# Patient Record
Sex: FEMALE | Race: WHITE | NOT HISPANIC OR LATINO | Employment: UNEMPLOYED | URBAN - METROPOLITAN AREA
[De-identification: names, ages, dates, MRNs, and addresses within clinical notes are randomized per-mention and may not be internally consistent; named-entity substitution may affect disease eponyms.]

---

## 2023-08-15 ENCOUNTER — HOSPITAL ENCOUNTER (OUTPATIENT)
Dept: DATA CONVERSION | Facility: HOSPITAL | Age: 58
End: 2023-08-15

## 2023-08-15 DIAGNOSIS — N20.0 CALCULUS OF KIDNEY: ICD-10-CM

## 2023-08-28 ENCOUNTER — HOSPITAL ENCOUNTER (OUTPATIENT)
Dept: DATA CONVERSION | Facility: HOSPITAL | Age: 58
Discharge: HOME | End: 2023-08-28
Payer: COMMERCIAL

## 2023-08-28 DIAGNOSIS — D64.9 ANEMIA, UNSPECIFIED: ICD-10-CM

## 2023-08-28 DIAGNOSIS — J02.9 ACUTE PHARYNGITIS, UNSPECIFIED: ICD-10-CM

## 2023-08-28 DIAGNOSIS — J18.9 PNEUMONIA, UNSPECIFIED ORGANISM: ICD-10-CM

## 2023-08-28 DIAGNOSIS — B37.81 CANDIDAL ESOPHAGITIS (MULTI): ICD-10-CM

## 2023-08-28 DIAGNOSIS — D72.829 ELEVATED WHITE BLOOD CELL COUNT, UNSPECIFIED: ICD-10-CM

## 2023-08-28 DIAGNOSIS — K13.79 OTHER LESIONS OF ORAL MUCOSA: ICD-10-CM

## 2023-08-28 LAB
ABO + RH BLD: NORMAL
ANION GAP SERPL CALCULATED.3IONS-SCNC: 13 MMOL/L (ref 0–19)
BACTERIA SPEC CULT: NORMAL
BASOPHILS # BLD AUTO: 0.03 K/UL (ref 0–0.22)
BASOPHILS NFR BLD AUTO: 0.2 % (ref 0–1)
BLD GP AB SCN SERPL QL: NEGATIVE
BLD PROD TYP BPU: NORMAL
BUN SERPL-MCNC: 13 MG/DL (ref 8–25)
BUN/CREAT SERPL: 16.3 RATIO (ref 8–21)
CALCIUM SERPL-MCNC: 11.2 MG/DL (ref 8.5–10.4)
CHLORIDE SERPL-SCNC: 101 MMOL/L (ref 97–107)
CO2 SERPL-SCNC: 24 MMOL/L (ref 24–31)
CREAT SERPL-MCNC: 0.8 MG/DL (ref 0.4–1.6)
DEPRECATED RDW RBC AUTO: 42.5 FL (ref 37–54)
DEPRECATED S PYO AG THROAT QL EIA: NORMAL
DIFFERENTIAL METHOD BLD: ABNORMAL
EOSINOPHIL # BLD AUTO: 0.16 K/UL (ref 0–0.45)
EOSINOPHIL NFR BLD: 1.2 % (ref 0–3)
ERYTHROCYTE [DISTWIDTH] IN BLOOD BY AUTOMATED COUNT: 17.6 % (ref 11.7–15)
GFR SERPL CREATININE-BSD FRML MDRD: 85 ML/MIN/1.73 M2
GLUCOSE SERPL-MCNC: 140 MG/DL (ref 65–99)
HCT VFR BLD AUTO: 30.9 % (ref 36–44)
HGB BLD-MCNC: 9.1 GM/DL (ref 12–15)
HX OF BLOOD TRANSFUSION: NORMAL
IMM GRANULOCYTES # BLD AUTO: 0.06 K/UL (ref 0–0.1)
LYMPHOCYTES # BLD AUTO: 3.76 K/UL (ref 1.2–3.2)
LYMPHOCYTES NFR BLD MANUAL: 27.8 % (ref 20–40)
MCH RBC QN AUTO: 20.2 PG (ref 26–34)
MCHC RBC AUTO-ENTMCNC: 29.4 % (ref 31–37)
MCV RBC AUTO: 68.7 FL (ref 80–100)
MONOCYTES # BLD AUTO: 0.85 K/UL (ref 0–0.8)
MONOCYTES NFR BLD MANUAL: 6.3 % (ref 0–8)
NEUTROPHILS # BLD AUTO: 8.66 K/UL
NEUTROPHILS # BLD AUTO: 8.66 K/UL (ref 1.8–7.7)
NEUTROPHILS.IMMATURE NFR BLD: 0.4 % (ref 0–1)
NEUTS SEG NFR BLD: 64.1 % (ref 50–70)
NRBC BLD-RTO: 0 /100 WBC
NUM BPU REQUESTED: 0
PLATELET # BLD AUTO: 487 K/UL (ref 150–450)
PMV BLD AUTO: 9.3 CU (ref 7–12.6)
POTASSIUM SERPL-SCNC: 4.4 MMOL/L (ref 3.4–5.1)
RBC # BLD AUTO: 4.5 M/UL (ref 4–4.9)
REPORT STATUS -LH SQ DATA CONVERSION: NORMAL
SERVICE CMNT-IMP: NORMAL
SODIUM SERPL-SCNC: 138 MMOL/L (ref 133–145)
SPECIMEN EXP DATE BLD: NORMAL
SPECIMEN SOURCE: NORMAL
TEST ORDERED: NORMAL
TRANSF BAND NUM PATIENT: NORMAL
WBC # BLD AUTO: 13.5 K/UL (ref 4.5–11)

## 2023-09-01 ENCOUNTER — HOSPITAL ENCOUNTER (OUTPATIENT)
Dept: DATA CONVERSION | Facility: HOSPITAL | Age: 58
Discharge: HOME | End: 2023-09-01
Payer: COMMERCIAL

## 2023-09-01 DIAGNOSIS — E83.52 HYPERCALCEMIA: ICD-10-CM

## 2023-09-01 LAB
CA-I BLD-SCNC: 1.21 MMOL/L (ref 1.1–1.33)
CALCIUM SERPL-MCNC: 10 MG/DL (ref 8.5–10.4)
PHOSPHATE SERPL-MCNC: 3 MG/DL (ref 2.5–4.5)
PTH-INTACT SERPL-MCNC: 17 PG/ML (ref 15–65)

## 2023-10-04 DIAGNOSIS — I10 ESSENTIAL (PRIMARY) HYPERTENSION: ICD-10-CM

## 2023-10-09 RX ORDER — LISINOPRIL 20 MG/1
20 TABLET ORAL DAILY
Qty: 90 TABLET | Refills: 3 | Status: SHIPPED | OUTPATIENT
Start: 2023-10-09 | End: 2024-02-07 | Stop reason: WASHOUT

## 2023-10-12 DIAGNOSIS — R10.84 GENERALIZED ABDOMINAL PAIN: Primary | ICD-10-CM

## 2023-10-12 RX ORDER — TRAMADOL HYDROCHLORIDE 50 MG/1
50 TABLET ORAL EVERY 6 HOURS
Qty: 28 TABLET | Refills: 0 | Status: SHIPPED | OUTPATIENT
Start: 2023-10-12 | End: 2023-10-19

## 2023-10-12 RX ORDER — TRAMADOL HYDROCHLORIDE 50 MG/1
TABLET ORAL EVERY 6 HOURS
COMMUNITY
Start: 2023-05-10 | End: 2023-10-12 | Stop reason: SDUPTHER

## 2023-10-12 NOTE — TELEPHONE ENCOUNTER
Patient states her side is hurting and she needs her tramadol refilled.  She stated she is completely out of medication.

## 2023-10-20 ENCOUNTER — TELEPHONE (OUTPATIENT)
Dept: PRIMARY CARE | Facility: CLINIC | Age: 58
End: 2023-10-20
Payer: COMMERCIAL

## 2023-10-20 DIAGNOSIS — R10.9 SIDE PAIN: Primary | ICD-10-CM

## 2023-10-20 RX ORDER — TRAMADOL HYDROCHLORIDE 50 MG/1
50 TABLET ORAL EVERY 6 HOURS PRN
Qty: 15 TABLET | Refills: 0 | Status: SHIPPED | OUTPATIENT
Start: 2023-10-20 | End: 2023-10-27

## 2023-10-20 NOTE — TELEPHONE ENCOUNTER
Called and spoke with pt, who verbally understands. Pt states that she will keep an eye on symptoms and will call to make an appt.

## 2023-10-20 NOTE — TELEPHONE ENCOUNTER
Patient is requesting a refill on her Tramadol. She states that she is having some side pain.   She also states that she had some a biopsy done and she's having an issue with her stomach     [place] coverage

## 2023-10-24 DIAGNOSIS — I10 ESSENTIAL (PRIMARY) HYPERTENSION: ICD-10-CM

## 2023-10-24 DIAGNOSIS — K25.9 GASTRIC ULCER, UNSPECIFIED AS ACUTE OR CHRONIC, WITHOUT HEMORRHAGE OR PERFORATION: ICD-10-CM

## 2023-10-24 RX ORDER — HYDRALAZINE HYDROCHLORIDE 50 MG/1
50 TABLET, FILM COATED ORAL 3 TIMES DAILY
Status: CANCELLED | OUTPATIENT
Start: 2023-10-24

## 2023-10-24 RX ORDER — HYDRALAZINE HYDROCHLORIDE 50 MG/1
50 TABLET, FILM COATED ORAL 3 TIMES DAILY
COMMUNITY
End: 2023-10-29 | Stop reason: ALTCHOICE

## 2023-10-24 RX ORDER — AMLODIPINE BESYLATE 10 MG/1
1 TABLET ORAL DAILY
COMMUNITY
End: 2023-10-24 | Stop reason: SDUPTHER

## 2023-10-26 NOTE — TELEPHONE ENCOUNTER
Pt called stating she received Tramodol 15mg instead of: 28mg every 6 hours with refills -- she is requesting new Rx for this

## 2023-10-27 NOTE — TELEPHONE ENCOUNTER
Patient called and stated she only received 15 pills of her Tramadol and is asking for her full script of 28 pills to be completed

## 2023-10-29 RX ORDER — OMEPRAZOLE 40 MG/1
CAPSULE, DELAYED RELEASE ORAL
Qty: 60 CAPSULE | Refills: 2 | Status: SHIPPED | OUTPATIENT
Start: 2023-10-29 | End: 2023-12-31

## 2023-10-29 RX ORDER — AMLODIPINE BESYLATE 10 MG/1
10 TABLET ORAL DAILY
Qty: 30 TABLET | Refills: 2 | Status: SHIPPED | OUTPATIENT
Start: 2023-10-29 | End: 2024-02-20

## 2023-10-29 RX ORDER — HYDRALAZINE HYDROCHLORIDE 50 MG/1
50 TABLET, FILM COATED ORAL 3 TIMES DAILY
Qty: 90 TABLET | Refills: 1 | Status: SHIPPED | OUTPATIENT
Start: 2023-10-29

## 2023-10-30 NOTE — TELEPHONE ENCOUNTER
Pt called requesting Rx for her Tramadol medication - Instructed pt Dr. Matson had sent in a refill on 10/20/23 for this; pt states this was not enough medication

## 2023-11-01 DIAGNOSIS — G89.29 CHRONIC ABDOMINAL PAIN: ICD-10-CM

## 2023-11-01 DIAGNOSIS — G89.29 CHRONIC ABDOMINAL PAIN: Primary | ICD-10-CM

## 2023-11-01 DIAGNOSIS — R10.9 CHRONIC ABDOMINAL PAIN: Primary | ICD-10-CM

## 2023-11-01 DIAGNOSIS — E11.9 TYPE 2 DIABETES MELLITUS WITHOUT COMPLICATIONS (MULTI): ICD-10-CM

## 2023-11-01 DIAGNOSIS — R10.9 CHRONIC ABDOMINAL PAIN: ICD-10-CM

## 2023-11-01 RX ORDER — TRAMADOL HYDROCHLORIDE 50 MG/1
50 TABLET ORAL EVERY 6 HOURS PRN
Qty: 28 TABLET | Refills: 0 | Status: SHIPPED | OUTPATIENT
Start: 2023-11-01 | End: 2023-11-10 | Stop reason: SDUPTHER

## 2023-11-02 RX ORDER — METFORMIN HYDROCHLORIDE 1000 MG/1
TABLET ORAL
Qty: 180 TABLET | Refills: 3 | Status: SHIPPED | OUTPATIENT
Start: 2023-11-02

## 2023-11-03 ENCOUNTER — TELEMEDICINE (OUTPATIENT)
Dept: PRIMARY CARE | Facility: CLINIC | Age: 58
End: 2023-11-03
Payer: COMMERCIAL

## 2023-11-03 VITALS — HEIGHT: 60 IN | WEIGHT: 145 LBS | BODY MASS INDEX: 28.47 KG/M2

## 2023-11-03 DIAGNOSIS — G89.29 CHRONIC DENTAL PAIN: Primary | ICD-10-CM

## 2023-11-03 DIAGNOSIS — K08.9 CHRONIC DENTAL PAIN: Primary | ICD-10-CM

## 2023-11-03 PROCEDURE — 99441 PR PHYS/QHP TELEPHONE EVALUATION 5-10 MIN: CPT | Performed by: FAMILY MEDICINE

## 2023-11-03 RX ORDER — AMOXICILLIN 875 MG/1
875 TABLET, FILM COATED ORAL 2 TIMES DAILY
Qty: 14 TABLET | Refills: 0 | Status: SHIPPED | OUTPATIENT
Start: 2023-11-03 | End: 2023-11-10

## 2023-11-03 RX ORDER — SPIRONOLACTONE AND HYDROCHLOROTHIAZIDE 25; 25 MG/1; MG/1
1 TABLET ORAL EVERY 24 HOURS
COMMUNITY
Start: 2023-09-22 | End: 2023-12-01

## 2023-11-03 ASSESSMENT — PAIN SCALES - GENERAL: PAINLEVEL: 8

## 2023-11-03 ASSESSMENT — PATIENT HEALTH QUESTIONNAIRE - PHQ9
SUM OF ALL RESPONSES TO PHQ9 QUESTIONS 1 AND 2: 0
2. FEELING DOWN, DEPRESSED OR HOPELESS: NOT AT ALL
1. LITTLE INTEREST OR PLEASURE IN DOING THINGS: NOT AT ALL

## 2023-11-03 NOTE — PROGRESS NOTES
Subjective   Patient ID: Caty Lal is a 58 y.o. female who presents for Dental Injury (Patient had part of her tooth fall out and is requesting antibiotics, said her dentist cannot see her until Monday/dmw).    Telephone call, video did not connect.    Pt state has fractured and open tooth.  Having a pain and sensitivity.  Tooth is swollen and having a lot of pain,  has dental appt on Monday.  She states dentist told her to call for abx.  She cheek is swollen, no issues swallowing.     Dental Injury          Review of Systems -see above    Objective   Ht 1.524 m (5')   Wt 65.8 kg (145 lb)   BMI 28.32 kg/m²     Physical Exam -telemed    Assessment/Plan   Diagnoses and all orders for this visit:  Chronic dental pain  -     amoxicillin (Amoxil) 875 mg tablet; Take 1 tablet (875 mg) by mouth 2 times a day for 7 days.    Follow up dentist

## 2023-11-10 RX ORDER — TRAMADOL HYDROCHLORIDE 50 MG/1
50 TABLET ORAL EVERY 6 HOURS PRN
Qty: 28 TABLET | Refills: 0 | Status: SHIPPED | OUTPATIENT
Start: 2023-11-10 | End: 2023-11-17 | Stop reason: SDUPTHER

## 2023-11-17 DIAGNOSIS — R10.9 CHRONIC ABDOMINAL PAIN: ICD-10-CM

## 2023-11-17 DIAGNOSIS — G89.29 CHRONIC ABDOMINAL PAIN: ICD-10-CM

## 2023-11-21 RX ORDER — TRAMADOL HYDROCHLORIDE 50 MG/1
50 TABLET ORAL EVERY 6 HOURS PRN
Qty: 28 TABLET | Refills: 2 | Status: SHIPPED | OUTPATIENT
Start: 2023-11-21 | End: 2023-12-08 | Stop reason: SDUPTHER

## 2023-11-21 NOTE — TELEPHONE ENCOUNTER
Patient is requesting medication be called in today  
Pt called again requesting refill - she is out of medication   
Pt called back, she is out of medication - she would also like her lab orders to test her iron levels   
Pt called requesting lab orders to check her iron levels for her anemia deficiency --- she is also requesting her Tramadol be called in to [facility] in [place] - she would like a phone call when her labs are in   
Calm

## 2023-11-29 DIAGNOSIS — I10 ESSENTIAL (PRIMARY) HYPERTENSION: ICD-10-CM

## 2023-12-01 RX ORDER — SPIRONOLACTONE AND HYDROCHLOROTHIAZIDE 25; 25 MG/1; MG/1
1 TABLET ORAL DAILY
Qty: 30 TABLET | Refills: 11 | Status: SHIPPED | OUTPATIENT
Start: 2023-12-01 | End: 2024-02-07 | Stop reason: SDUPTHER

## 2023-12-08 DIAGNOSIS — R10.9 CHRONIC ABDOMINAL PAIN: ICD-10-CM

## 2023-12-08 DIAGNOSIS — D50.9 IRON DEFICIENCY ANEMIA, UNSPECIFIED IRON DEFICIENCY ANEMIA TYPE: Primary | ICD-10-CM

## 2023-12-08 DIAGNOSIS — G89.29 CHRONIC ABDOMINAL PAIN: ICD-10-CM

## 2023-12-09 RX ORDER — TRAMADOL HYDROCHLORIDE 50 MG/1
50 TABLET ORAL EVERY 6 HOURS PRN
Qty: 28 TABLET | Refills: 2 | Status: SHIPPED | OUTPATIENT
Start: 2023-12-09 | End: 2024-01-02 | Stop reason: SDUPTHER

## 2023-12-27 DIAGNOSIS — K25.9 GASTRIC ULCER, UNSPECIFIED AS ACUTE OR CHRONIC, WITHOUT HEMORRHAGE OR PERFORATION: ICD-10-CM

## 2023-12-27 DIAGNOSIS — R10.9 SIDE PAIN: Primary | ICD-10-CM

## 2023-12-31 RX ORDER — OMEPRAZOLE 40 MG/1
40 CAPSULE, DELAYED RELEASE ORAL 2 TIMES DAILY
Qty: 180 CAPSULE | Refills: 3 | Status: SHIPPED | OUTPATIENT
Start: 2023-12-31 | End: 2024-12-30

## 2023-12-31 RX ORDER — NAPROXEN 500 MG/1
TABLET ORAL
Qty: 60 TABLET | Refills: 2 | Status: SHIPPED | OUTPATIENT
Start: 2023-12-31 | End: 2024-03-27

## 2024-01-02 DIAGNOSIS — G89.29 CHRONIC ABDOMINAL PAIN: ICD-10-CM

## 2024-01-02 DIAGNOSIS — R10.9 CHRONIC ABDOMINAL PAIN: ICD-10-CM

## 2024-01-03 RX ORDER — TRAMADOL HYDROCHLORIDE 50 MG/1
50 TABLET ORAL EVERY 6 HOURS PRN
Qty: 28 TABLET | Refills: 2 | Status: SHIPPED | OUTPATIENT
Start: 2024-01-03 | End: 2024-01-22 | Stop reason: SDUPTHER

## 2024-01-15 DIAGNOSIS — E11.9 TYPE 2 DIABETES MELLITUS WITHOUT COMPLICATIONS (MULTI): ICD-10-CM

## 2024-01-15 RX ORDER — BLOOD-GLUCOSE METER
EACH MISCELLANEOUS
Qty: 100 STRIP | Refills: 1 | Status: SHIPPED | OUTPATIENT
Start: 2024-01-15 | End: 2024-04-22 | Stop reason: SDUPTHER

## 2024-01-22 DIAGNOSIS — R10.9 CHRONIC ABDOMINAL PAIN: ICD-10-CM

## 2024-01-22 DIAGNOSIS — G89.29 CHRONIC ABDOMINAL PAIN: ICD-10-CM

## 2024-01-23 RX ORDER — TRAMADOL HYDROCHLORIDE 50 MG/1
50 TABLET ORAL EVERY 6 HOURS PRN
Qty: 28 TABLET | Refills: 2 | Status: SHIPPED | OUTPATIENT
Start: 2024-01-23 | End: 2024-02-07 | Stop reason: SDUPTHER

## 2024-02-07 ENCOUNTER — OFFICE VISIT (OUTPATIENT)
Dept: PRIMARY CARE | Facility: CLINIC | Age: 59
End: 2024-02-07
Payer: COMMERCIAL

## 2024-02-07 VITALS
WEIGHT: 152 LBS | SYSTOLIC BLOOD PRESSURE: 134 MMHG | OXYGEN SATURATION: 97 % | DIASTOLIC BLOOD PRESSURE: 78 MMHG | HEIGHT: 60 IN | BODY MASS INDEX: 29.84 KG/M2 | HEART RATE: 67 BPM

## 2024-02-07 DIAGNOSIS — D50.9 IRON DEFICIENCY ANEMIA, UNSPECIFIED IRON DEFICIENCY ANEMIA TYPE: ICD-10-CM

## 2024-02-07 DIAGNOSIS — J06.9 VIRAL UPPER RESPIRATORY TRACT INFECTION: Primary | ICD-10-CM

## 2024-02-07 DIAGNOSIS — I10 ESSENTIAL (PRIMARY) HYPERTENSION: ICD-10-CM

## 2024-02-07 DIAGNOSIS — G89.29 CHRONIC ABDOMINAL PAIN: ICD-10-CM

## 2024-02-07 DIAGNOSIS — R10.9 CHRONIC ABDOMINAL PAIN: ICD-10-CM

## 2024-02-07 DIAGNOSIS — E11.9 ALTERED METABOLISM DUE TO DIABETES (MULTI): ICD-10-CM

## 2024-02-07 LAB — POC HEMOGLOBIN A1C: 7.6 % (ref 4.2–6.5)

## 2024-02-07 PROCEDURE — 99214 OFFICE O/P EST MOD 30 MIN: CPT | Performed by: FAMILY MEDICINE

## 2024-02-07 PROCEDURE — 83036 HEMOGLOBIN GLYCOSYLATED A1C: CPT | Performed by: FAMILY MEDICINE

## 2024-02-07 PROCEDURE — 3078F DIAST BP <80 MM HG: CPT | Performed by: FAMILY MEDICINE

## 2024-02-07 PROCEDURE — 3075F SYST BP GE 130 - 139MM HG: CPT | Performed by: FAMILY MEDICINE

## 2024-02-07 PROCEDURE — 87636 SARSCOV2 & INF A&B AMP PRB: CPT | Mod: WESLAB | Performed by: FAMILY MEDICINE

## 2024-02-07 PROCEDURE — 1036F TOBACCO NON-USER: CPT | Performed by: FAMILY MEDICINE

## 2024-02-07 RX ORDER — SPIRONOLACTONE AND HYDROCHLOROTHIAZIDE 25; 25 MG/1; MG/1
1 TABLET ORAL DAILY
Qty: 30 TABLET | Refills: 11 | Status: SHIPPED | OUTPATIENT
Start: 2024-02-07

## 2024-02-07 RX ORDER — TRAMADOL HYDROCHLORIDE 50 MG/1
50 TABLET ORAL EVERY 8 HOURS PRN
Qty: 90 TABLET | Refills: 2 | Status: SHIPPED | OUTPATIENT
Start: 2024-02-12 | End: 2024-04-23 | Stop reason: SDUPTHER

## 2024-02-07 RX ORDER — AZITHROMYCIN 250 MG/1
TABLET, FILM COATED ORAL
Qty: 6 TABLET | Refills: 0 | Status: SHIPPED | OUTPATIENT
Start: 2024-02-07 | End: 2024-02-12

## 2024-02-07 ASSESSMENT — ENCOUNTER SYMPTOMS
COUGH: 1
DIARRHEA: 0
SINUS PAIN: 1
CHILLS: 1
ABDOMINAL PAIN: 1
FEVER: 0
ACTIVITY CHANGE: 1
SORE THROAT: 0
SHORTNESS OF BREATH: 0
SINUS PRESSURE: 1
FATIGUE: 1
CONSTIPATION: 0
APPETITE CHANGE: 1

## 2024-02-07 ASSESSMENT — PATIENT HEALTH QUESTIONNAIRE - PHQ9
SUM OF ALL RESPONSES TO PHQ9 QUESTIONS 1 AND 2: 0
1. LITTLE INTEREST OR PLEASURE IN DOING THINGS: NOT AT ALL
2. FEELING DOWN, DEPRESSED OR HOPELESS: NOT AT ALL

## 2024-02-07 ASSESSMENT — PAIN SCALES - GENERAL: PAINLEVEL: 0-NO PAIN

## 2024-02-07 NOTE — PROGRESS NOTES
[facility]: [facility]  E/M EVALUATION    Caty Lal is a 58 y.o. female who presents for Nasal Congestion (Patient said she has been having sick symptoms, cough and congestion( sore throat)/dd) and Med Refill (Patient is needing refills on Spironolactone and Tramadol/dd).    Subjective   1)Croupy cough since last night, feverish, ear ache.  Tylenol.     2)chronic pain- uses daily tramadol.  Requests refills.  Has gastroparesis, chronic anemia last hb 9.8.      3)HTN- stable.               Med Refill  Associated symptoms include abdominal pain, chills, coughing and fatigue. Pertinent negatives include no fever or sore throat.     Review of Systems   Constitutional:  Positive for activity change, appetite change, chills and fatigue. Negative for fever.   HENT:  Positive for ear pain, sinus pressure, sinus pain and sneezing. Negative for sore throat.    Respiratory:  Positive for cough. Negative for shortness of breath.    Gastrointestinal:  Positive for abdominal pain. Negative for constipation and diarrhea.       Objective   Vitals:    02/07/24 1135   BP: 134/78   Pulse: 67   SpO2: 97%     Physical Exam  Constitutional:       Appearance: Normal appearance.   HENT:      Right Ear: Tympanic membrane normal.      Left Ear: Tympanic membrane normal.      Nose: Mucosal edema and rhinorrhea present.      Mouth/Throat:      Pharynx: Oropharynx is clear.   Cardiovascular:      Rate and Rhythm: Normal rate and regular rhythm.      Heart sounds: No murmur heard.  Pulmonary:      Effort: Pulmonary effort is normal.      Breath sounds: Normal breath sounds.   Musculoskeletal:      Right lower leg: No edema.      Left lower leg: No edema.   Lymphadenopathy:      Cervical: No cervical adenopathy.   Neurological:      Mental Status: She is alert.           Assessment/Plan      There is no problem list on file for this patient.      Diagnoses and all orders for this visit:  Viral upper respiratory  tract infection  -     Sars-CoV-2 and Influenza A/B PCR; Future  -     azithromycin (Zithromax) 250 mg tablet; Take 2 tablets (500 mg) by mouth once daily for 1 day, THEN 1 tablet (250 mg) once daily for 4 days. Take 2 tabs (500 mg) by mouth today, than 1 daily for 4 days..  Chronic abdominal pain  -     traMADol (Ultram) 50 mg tablet; Take 1 tablet (50 mg) by mouth every 8 hours if needed for severe pain (7 - 10). Do not start before February 12, 2024.  Essential (primary) hypertension  -     spironolacton-hydrochlorothiaz (Aldactazide) 25-25 mg tablet; Take 1 tablet (25 mg) by mouth once daily.  Altered metabolism due to diabetes (CMS/HCC)  -     POCT glycosylated hemoglobin (Hb A1C) manually resulted  Diet changes reviewed.    Consent signed. UDS will be done next visit with microalbumin

## 2024-02-07 NOTE — LETTER
February 7, 2024     Patient: Caty Lal   YOB: 1965   Date of Visit: 2/7/2024       To Whom It May Concern:    Caty Lal was seen in my clinic on 2/7/2024. Please excuse Caty for her absence from work on 02/07/2024 and 02/08/2024.    If you have any questions or concerns, please don't hesitate to call.         Sincerely,           Cj Ji MD

## 2024-02-08 LAB
FLUAV RNA RESP QL NAA+PROBE: DETECTED
FLUBV RNA RESP QL NAA+PROBE: NOT DETECTED
SARS-COV-2 RNA RESP QL NAA+PROBE: NOT DETECTED

## 2024-02-09 ENCOUNTER — TELEPHONE (OUTPATIENT)
Dept: PRIMARY CARE | Facility: CLINIC | Age: 59
End: 2024-02-09
Payer: COMMERCIAL

## 2024-02-09 NOTE — TELEPHONE ENCOUNTER
----- Message from Cj Ji MD sent at 2/8/2024  1:32 PM EST -----  Notify patient she has influenza, prescriptive therapy wont help, rest, hdyration.

## 2024-02-12 ENCOUNTER — TELEPHONE (OUTPATIENT)
Dept: PRIMARY CARE | Facility: CLINIC | Age: 59
End: 2024-02-12
Payer: COMMERCIAL

## 2024-02-12 NOTE — TELEPHONE ENCOUNTER
Patient called and stated that she was diagnosed with the flu last Wednesday.  She is concerned because she is still coughing and is having a difficult time catching her breath.  She would like advice.  Thank you.

## 2024-02-14 NOTE — TELEPHONE ENCOUNTER
Spoke with the Pt she went to the ER  at the [facility] she was given antibiotics,cough medication and breathing treatment.Pt feels a little bit better but still coughing.

## 2024-02-19 DIAGNOSIS — I10 ESSENTIAL (PRIMARY) HYPERTENSION: ICD-10-CM

## 2024-02-20 RX ORDER — AMLODIPINE BESYLATE 10 MG/1
10 TABLET ORAL DAILY
Qty: 30 TABLET | Refills: 11 | Status: SHIPPED | OUTPATIENT
Start: 2024-02-20

## 2024-03-11 ENCOUNTER — TELEPHONE (OUTPATIENT)
Dept: PRIMARY CARE | Facility: CLINIC | Age: 59
End: 2024-03-11
Payer: COMMERCIAL

## 2024-03-11 DIAGNOSIS — K04.7 DENTAL INFECTION: Primary | ICD-10-CM

## 2024-03-11 DIAGNOSIS — R10.13 DYSPEPSIA: ICD-10-CM

## 2024-03-11 RX ORDER — AMOXICILLIN 875 MG/1
875 TABLET, FILM COATED ORAL 2 TIMES DAILY
Qty: 20 TABLET | Refills: 0 | Status: SHIPPED | OUTPATIENT
Start: 2024-03-11 | End: 2024-03-21

## 2024-03-26 DIAGNOSIS — R10.9 SIDE PAIN: ICD-10-CM

## 2024-03-27 RX ORDER — NAPROXEN 500 MG/1
TABLET ORAL
Qty: 60 TABLET | Refills: 2 | Status: SHIPPED | OUTPATIENT
Start: 2024-03-27

## 2024-04-22 DIAGNOSIS — E11.9 TYPE 2 DIABETES MELLITUS WITHOUT COMPLICATIONS (MULTI): ICD-10-CM

## 2024-04-22 RX ORDER — BLOOD-GLUCOSE METER
EACH MISCELLANEOUS
Qty: 100 STRIP | Refills: 1 | Status: SHIPPED | OUTPATIENT
Start: 2024-04-22

## 2024-04-23 ENCOUNTER — OFFICE VISIT (OUTPATIENT)
Dept: PRIMARY CARE | Facility: CLINIC | Age: 59
End: 2024-04-23
Payer: COMMERCIAL

## 2024-04-23 VITALS
BODY MASS INDEX: 30.63 KG/M2 | OXYGEN SATURATION: 99 % | DIASTOLIC BLOOD PRESSURE: 82 MMHG | SYSTOLIC BLOOD PRESSURE: 130 MMHG | HEART RATE: 85 BPM | WEIGHT: 156 LBS | HEIGHT: 60 IN

## 2024-04-23 DIAGNOSIS — R30.0 DYSURIA: ICD-10-CM

## 2024-04-23 DIAGNOSIS — E11.9 ALTERED METABOLISM DUE TO DIABETES (MULTI): Primary | ICD-10-CM

## 2024-04-23 DIAGNOSIS — G89.29 CHRONIC ABDOMINAL PAIN: ICD-10-CM

## 2024-04-23 DIAGNOSIS — R10.9 CHRONIC ABDOMINAL PAIN: ICD-10-CM

## 2024-04-23 DIAGNOSIS — D50.9 IRON DEFICIENCY ANEMIA, UNSPECIFIED IRON DEFICIENCY ANEMIA TYPE: ICD-10-CM

## 2024-04-23 LAB
AMPHETAMINES UR QL SCN>1000 NG/ML: NEGATIVE
BARBITURATES UR QL SCN>300 NG/ML: NEGATIVE
BENZODIAZ UR QL SCN>300 NG/ML: NEGATIVE
BZE UR QL SCN>300 NG/ML: NEGATIVE
CANNABINOIDS UR QL SCN>50 NG/ML: NEGATIVE
FENTANYL+NORFENTANYL UR QL SCN: NEGATIVE
METHADONE UR QL SCN>300 NG/ML: NEGATIVE
OPIATES UR QL SCN>300 NG/ML: NEGATIVE
OXYCODONE UR QL: NEGATIVE
PCP UR QL SCN>25 NG/ML: NEGATIVE
POC APPEARANCE, URINE: CLEAR
POC BILIRUBIN, URINE: NEGATIVE
POC BLOOD, URINE: NEGATIVE
POC COLOR, URINE: YELLOW
POC GLUCOSE, URINE: NEGATIVE MG/DL
POC KETONES, URINE: NEGATIVE MG/DL
POC LEUKOCYTES, URINE: ABNORMAL
POC NITRITE,URINE: NEGATIVE
POC PH, URINE: 6 PH
POC PROTEIN, URINE: NEGATIVE MG/DL
POC SPECIFIC GRAVITY, URINE: 1.02
POC UROBILINOGEN, URINE: 0.2 EU/DL

## 2024-04-23 PROCEDURE — 80307 DRUG TEST PRSMV CHEM ANLYZR: CPT | Performed by: FAMILY MEDICINE

## 2024-04-23 PROCEDURE — 99214 OFFICE O/P EST MOD 30 MIN: CPT | Performed by: FAMILY MEDICINE

## 2024-04-23 PROCEDURE — 87086 URINE CULTURE/COLONY COUNT: CPT | Mod: WESLAB | Performed by: FAMILY MEDICINE

## 2024-04-23 PROCEDURE — 3075F SYST BP GE 130 - 139MM HG: CPT | Performed by: FAMILY MEDICINE

## 2024-04-23 PROCEDURE — 81003 URINALYSIS AUTO W/O SCOPE: CPT | Mod: 59 | Performed by: FAMILY MEDICINE

## 2024-04-23 PROCEDURE — 3079F DIAST BP 80-89 MM HG: CPT | Performed by: FAMILY MEDICINE

## 2024-04-23 PROCEDURE — 1036F TOBACCO NON-USER: CPT | Performed by: FAMILY MEDICINE

## 2024-04-23 RX ORDER — INSULIN GLARGINE 100 [IU]/ML
20 INJECTION, SOLUTION SUBCUTANEOUS 2 TIMES DAILY
COMMUNITY
Start: 2023-05-21 | End: 2024-04-23 | Stop reason: WASHOUT

## 2024-04-23 RX ORDER — TRAMADOL HYDROCHLORIDE 50 MG/1
50 TABLET ORAL EVERY 8 HOURS PRN
Qty: 90 TABLET | Refills: 2 | Status: SHIPPED | OUTPATIENT
Start: 2024-04-23 | End: 2024-05-23

## 2024-04-23 RX ORDER — INSULIN GLARGINE 100 [IU]/ML
INJECTION, SOLUTION SUBCUTANEOUS
Start: 2024-04-23 | End: 2025-04-23

## 2024-04-23 ASSESSMENT — PATIENT HEALTH QUESTIONNAIRE - PHQ9
1. LITTLE INTEREST OR PLEASURE IN DOING THINGS: NOT AT ALL
2. FEELING DOWN, DEPRESSED OR HOPELESS: NOT AT ALL
SUM OF ALL RESPONSES TO PHQ9 QUESTIONS 1 AND 2: 0

## 2024-04-23 ASSESSMENT — ENCOUNTER SYMPTOMS
POLYDIPSIA: 0
ARTHRALGIAS: 1
NAUSEA: 1
DIARRHEA: 0
FATIGUE: 1
ABDOMINAL PAIN: 1

## 2024-04-23 ASSESSMENT — PAIN SCALES - GENERAL: PAINLEVEL: 7

## 2024-04-23 NOTE — PROGRESS NOTES
[facility]: [facility]  E/M EVALUATION    Caty Lal is a 58 y.o. female who presents for Follow-up (Patient is needing refills on Tramadol/dd).    Subjective   DM- stable but had as low as 40.      Influenza- a feb.  No respiratory issues.      Anemia- already on fe. Had GI.     Chronic pain.   Here for refills.  No change in symptoms.  Still chronic anemia.      Urinary urgency/ left flank pain, some intermittent dysuria, h/o of stones.       Review of Systems   Constitutional:  Positive for fatigue.   Gastrointestinal:  Positive for abdominal pain and nausea. Negative for diarrhea.   Endocrine: Negative for polydipsia and polyuria.   Musculoskeletal:  Positive for arthralgias.       Objective   Vitals:    04/23/24 0854   BP: 130/82   Pulse: 85   SpO2: 99%     Physical Exam  Constitutional:       Appearance: Normal appearance.   Cardiovascular:      Rate and Rhythm: Normal rate and regular rhythm.      Heart sounds: No murmur heard.  Pulmonary:      Effort: Pulmonary effort is normal.      Breath sounds: Normal breath sounds.   Musculoskeletal:      Right lower leg: No edema.      Left lower leg: No edema.   Neurological:      Mental Status: She is alert.           Assessment/Plan      There is no problem list on file for this patient.      Diagnoses and all orders for this visit:  Altered metabolism due to diabetes (Multi)  -     insulin glargine (Lantus) 100 unit/mL injection; Inject 20 Units under the skin once daily in the morning AND 16 Units once daily at bedtime. Take as directed per insulin instructions.  Iron deficiency anemia, unspecified iron deficiency anemia type  -     Referral to Hematology and Oncology; Future  Chronic abdominal pain  -     Drug Screen, Urine With Reflex to Confirmation; Future  -     Tramadol Confirmation, Urine; Future  -     traMADol (Ultram) 50 mg tablet; Take 1 tablet (50 mg) by mouth every 8 hours if needed for severe pain (7 -  10).  Dysuria  -     POCT UA Automated manually resulted  -     Urine Culture; Future      The patient was encouraged to ensure that any/all documentation is accurate and up to date, and that our office be provided a copy in the event that anything changes.         Cj Ji MD

## 2024-04-24 LAB — BACTERIA UR CULT: NORMAL

## 2024-04-29 ENCOUNTER — TELEPHONE (OUTPATIENT)
Dept: PRIMARY CARE | Facility: CLINIC | Age: 59
End: 2024-04-29
Payer: COMMERCIAL

## 2024-04-29 NOTE — TELEPHONE ENCOUNTER
----- Message from Cj Ji MD sent at 4/29/2024 12:59 PM EDT -----  Please call the patient regarding her abnormal result.  No uti

## 2024-05-21 DIAGNOSIS — G89.29 CHRONIC ABDOMINAL PAIN: ICD-10-CM

## 2024-05-21 DIAGNOSIS — R10.9 CHRONIC ABDOMINAL PAIN: ICD-10-CM

## 2024-05-23 RX ORDER — TRAMADOL HYDROCHLORIDE 50 MG/1
50 TABLET ORAL EVERY 8 HOURS PRN
Qty: 90 TABLET | Refills: 2 | Status: SHIPPED | OUTPATIENT
Start: 2024-05-23 | End: 2024-08-21

## 2024-06-25 ENCOUNTER — TELEPHONE (OUTPATIENT)
Dept: HEMATOLOGY/ONCOLOGY | Facility: HOSPITAL | Age: 59
End: 2024-06-25
Payer: COMMERCIAL

## 2024-06-25 NOTE — TELEPHONE ENCOUNTER
Patient scheduled for HEM ONC NEW PATIENT BENIGN HEMATOLOGY visit with PAT Buck today at 11 AM. Attempted to reach patient at 12 PM to see if patient was on her way or needed to reschedule appointment. Left detailed message, with call back number, for patient to call back at earliest convenience. No-show letter sent via [employer] and mail.

## 2024-06-27 NOTE — TELEPHONE ENCOUNTER
Patient scheduled for HEM ONC NEW PATIENT BENIGN HEMATOLOGY visit with PAT Buck on Tuesday, 6/25/24 at 11 AM. Patient no-showed appointment. Reached out to patient x2 to reschedule missed appointment. Patient agreeable to reschedule. Patient requested a provider closer. Informed patient providers closer to her are booked out into September/October. Offered to schedule patient with Dr. Pablo in [place] and schedule follow up appointment with Dr. Courtney Jacobo after initial visit. Patient agreeable. Scheduled patient for next available NPV on Wednesday, 7/17/24 at 3:15 PM. Patient verbalized understanding and agreed to plan of care/appointment.

## 2024-07-12 DIAGNOSIS — R10.9 SIDE PAIN: ICD-10-CM

## 2024-07-14 RX ORDER — NAPROXEN 500 MG/1
TABLET ORAL
Qty: 60 TABLET | Refills: 2 | Status: SHIPPED | OUTPATIENT
Start: 2024-07-14

## 2024-07-17 ENCOUNTER — OFFICE VISIT (OUTPATIENT)
Dept: HEMATOLOGY/ONCOLOGY | Facility: HOSPITAL | Age: 59
End: 2024-07-17
Payer: COMMERCIAL

## 2024-07-17 ENCOUNTER — LAB (OUTPATIENT)
Dept: LAB | Facility: LAB | Age: 59
End: 2024-07-17
Payer: COMMERCIAL

## 2024-07-17 VITALS
HEIGHT: 60 IN | SYSTOLIC BLOOD PRESSURE: 154 MMHG | WEIGHT: 155.2 LBS | BODY MASS INDEX: 30.47 KG/M2 | DIASTOLIC BLOOD PRESSURE: 85 MMHG | RESPIRATION RATE: 16 BRPM | HEART RATE: 94 BPM | TEMPERATURE: 95.9 F | OXYGEN SATURATION: 96 %

## 2024-07-17 DIAGNOSIS — D50.9 IRON DEFICIENCY ANEMIA, UNSPECIFIED IRON DEFICIENCY ANEMIA TYPE: Primary | ICD-10-CM

## 2024-07-17 DIAGNOSIS — D50.9 IRON DEFICIENCY ANEMIA, UNSPECIFIED IRON DEFICIENCY ANEMIA TYPE: ICD-10-CM

## 2024-07-17 PROCEDURE — 82378 CARCINOEMBRYONIC ANTIGEN: CPT

## 2024-07-17 PROCEDURE — 85025 COMPLETE CBC W/AUTO DIFF WBC: CPT

## 2024-07-17 PROCEDURE — 82728 ASSAY OF FERRITIN: CPT

## 2024-07-17 PROCEDURE — 36415 COLL VENOUS BLD VENIPUNCTURE: CPT

## 2024-07-17 PROCEDURE — 83550 IRON BINDING TEST: CPT

## 2024-07-17 PROCEDURE — 3008F BODY MASS INDEX DOCD: CPT | Performed by: INTERNAL MEDICINE

## 2024-07-17 PROCEDURE — 99204 OFFICE O/P NEW MOD 45 MIN: CPT | Performed by: INTERNAL MEDICINE

## 2024-07-17 PROCEDURE — 83615 LACTATE (LD) (LDH) ENZYME: CPT

## 2024-07-17 PROCEDURE — 83735 ASSAY OF MAGNESIUM: CPT

## 2024-07-17 PROCEDURE — 82746 ASSAY OF FOLIC ACID SERUM: CPT

## 2024-07-17 PROCEDURE — 82607 VITAMIN B-12: CPT

## 2024-07-17 PROCEDURE — 99214 OFFICE O/P EST MOD 30 MIN: CPT | Performed by: INTERNAL MEDICINE

## 2024-07-17 PROCEDURE — 80053 COMPREHEN METABOLIC PANEL: CPT

## 2024-07-17 PROCEDURE — 83540 ASSAY OF IRON: CPT

## 2024-07-17 RX ORDER — HEPARIN 100 UNIT/ML
500 SYRINGE INTRAVENOUS AS NEEDED
OUTPATIENT
Start: 2024-07-23

## 2024-07-17 RX ORDER — FAMOTIDINE 10 MG/ML
20 INJECTION INTRAVENOUS ONCE AS NEEDED
OUTPATIENT
Start: 2024-07-23

## 2024-07-17 RX ORDER — HEPARIN SODIUM,PORCINE/PF 10 UNIT/ML
50 SYRINGE (ML) INTRAVENOUS AS NEEDED
OUTPATIENT
Start: 2024-07-23

## 2024-07-17 RX ORDER — ALBUTEROL SULFATE 0.83 MG/ML
3 SOLUTION RESPIRATORY (INHALATION) AS NEEDED
OUTPATIENT
Start: 2024-07-23

## 2024-07-17 RX ORDER — EPINEPHRINE 0.3 MG/.3ML
0.3 INJECTION SUBCUTANEOUS EVERY 5 MIN PRN
OUTPATIENT
Start: 2024-07-23

## 2024-07-17 RX ORDER — DIPHENHYDRAMINE HYDROCHLORIDE 50 MG/ML
50 INJECTION INTRAMUSCULAR; INTRAVENOUS AS NEEDED
OUTPATIENT
Start: 2024-07-23

## 2024-07-17 ASSESSMENT — LIFESTYLE VARIABLES
HOW MANY STANDARD DRINKS CONTAINING ALCOHOL DO YOU HAVE ON A TYPICAL DAY: PATIENT DOES NOT DRINK
HOW OFTEN DO YOU HAVE A DRINK CONTAINING ALCOHOL: NEVER
SKIP TO QUESTIONS 9-10: 1
AUDIT-C TOTAL SCORE: 0
HOW OFTEN DO YOU HAVE SIX OR MORE DRINKS ON ONE OCCASION: NEVER

## 2024-07-17 ASSESSMENT — PAIN SCALES - GENERAL: PAINLEVEL: 5

## 2024-07-17 ASSESSMENT — ENCOUNTER SYMPTOMS
ABDOMINAL PAIN: 1
OCCASIONAL FEELINGS OF UNSTEADINESS: 1
FEVER: 0
UNEXPECTED WEIGHT CHANGE: 0
CARDIOVASCULAR NEGATIVE: 1
FATIGUE: 1
LOSS OF SENSATION IN FEET: 0
DEPRESSION: 0
RESPIRATORY NEGATIVE: 1
MYALGIAS: 1

## 2024-07-17 ASSESSMENT — COLUMBIA-SUICIDE SEVERITY RATING SCALE - C-SSRS
6. HAVE YOU EVER DONE ANYTHING, STARTED TO DO ANYTHING, OR PREPARED TO DO ANYTHING TO END YOUR LIFE?: NO
1. IN THE PAST MONTH, HAVE YOU WISHED YOU WERE DEAD OR WISHED YOU COULD GO TO SLEEP AND NOT WAKE UP?: NO
2. HAVE YOU ACTUALLY HAD ANY THOUGHTS OF KILLING YOURSELF?: NO

## 2024-07-17 NOTE — PROGRESS NOTES
Patient ID: Caty Lal is a 59 y.o. female.  Referring Physician: Cj Ji MD  [address] [address]  [place] [address]  Primary Care Provider: Cj Ji MD  Referral Reason: Iron deficiency anemia    Subjective:  Referred for iron deficiency anemia. Has had iron deficiency since mid 2023. Has had EGD and colonoscopy in Dec 2023 that were both neg for mass or bleeding. Pica++. Hair loss and brittle nails. Says she occaionally has tarry black stools. Has had kidney stones before and intermittently has hematuria. Has been on PO iron since Feb 2024.   Occasional LUQ abd pain.     Assessment/Plan:  ? Iron deficiency anemia: Hb has been around 9. Iron sat and ferritin were both low in outside lab. Has fatigue, pica, and hair loss.   Check labs today. Plan for feraheme x 2 since po iron has been ineffective.   Already had GI work-up. CT abd/pel in Mar 2023 were neg except kidney stones. Has FH of colon cancer. Check CEA today and get US Abdomen.   Will repeat iron indices in 3 months.     Review Of Systems:  Review of Systems   Constitutional:  Positive for fatigue. Negative for fever and unexpected weight change.   HENT:  Negative.     Respiratory: Negative.     Cardiovascular: Negative.    Gastrointestinal:  Positive for abdominal pain.   Genitourinary: Negative.     Musculoskeletal:  Positive for myalgias.       Physical Exam:  /85 (BP Location: Right arm, Patient Position: Sitting, BP Cuff Size: Adult)   Pulse 94   Temp 35.5 °C (95.9 °F) (Temporal)   Resp 16   Ht 1.524 m (5')   Wt 70.4 kg (155 lb 3.3 oz)   SpO2 96%   BMI 30.31 kg/m²   BSA: 1.73 meters squared  Performance Status: Asymptomatic  Physical Exam  Cardiovascular:      Rate and Rhythm: Normal rate and regular rhythm.   Pulmonary:      Effort: Pulmonary effort is normal.   Abdominal:      General: Abdomen is flat.      Palpations: Abdomen is soft.      Tenderness: There is abdominal tenderness (LUQ tenderness).   Skin:      Coloration: Skin is pale. Skin is not jaundiced.   Neurological:      General: No focal deficit present.      Mental Status: She is alert and oriented to person, place, and time.         Results:  Diagnostic Results   Lab Results   Component Value Date    WBC 13.5 (H) 08/28/2023    HGB 9.1 (L) 08/28/2023    HCT 30.9 (L) 08/28/2023    MCV 68.7 (L) 08/28/2023     (H) 08/28/2023     Lab Results   Component Value Date    CALCIUM 10.0 09/01/2023     08/28/2023    K 4.4 08/28/2023    CO2 24 08/28/2023     08/28/2023    BUN 13 08/28/2023    CREATININE 0.8 08/28/2023    ALT 16 03/22/2023    AST 19 03/22/2023       Current Outpatient Medications:     amLODIPine (Norvasc) 10 mg tablet, TAKE 1 TABLET BY MOUTH EVERY DAY, Disp: 30 tablet, Rfl: 11    insulin glargine (Lantus) 100 unit/mL injection, Inject 20 Units under the skin once daily in the morning AND 16 Units once daily at bedtime. Take as directed per insulin instructions., Disp: , Rfl:     metFORMIN (Glucophage) 1,000 mg tablet, TAKE 1 TABLET BY MOUTH TWICE DAILY with a meal, Disp: 180 tablet, Rfl: 3    naproxen (Naprosyn) 500 mg tablet, TAKE 1 TABLET BY MOUTH EVERY 12 HOURS WITH FOOD or milk AS NEEDED, Disp: 60 tablet, Rfl: 2    omeprazole (PriLOSEC) 40 mg DR capsule, Take 1 capsule (40 mg) by mouth 2 times a day., Disp: 180 capsule, Rfl: 3    OneTouch Verio test strips strip, use AS DIRECTED TWICE DAILY, Disp: 100 strip, Rfl: 1    spironolacton-hydrochlorothiaz (Aldactazide) 25-25 mg tablet, Take 1 tablet (25 mg) by mouth once daily., Disp: 30 tablet, Rfl: 11    traMADol (Ultram) 50 mg tablet, Take 1 tablet (50 mg) by mouth every 8 hours if needed for severe pain (7 - 10)., Disp: 90 tablet, Rfl: 2    hydrALAZINE (Apresoline) 50 mg tablet, take 1 tablet by mouth 3 times daily with food (Patient not taking: Reported on 7/17/2024), Disp: 90 tablet, Rfl: 1     No past surgical history on file.  No family history on file.   reports that she has never  smoked. She has never been exposed to tobacco smoke. She has never used smokeless tobacco.  Social History     Socioeconomic History    Marital status:      Spouse name: Not on file    Number of children: Not on file    Years of education: Not on file    Highest education level: Not on file   Occupational History    Not on file   Tobacco Use    Smoking status: Never     Passive exposure: Never    Smokeless tobacco: Never   Substance and Sexual Activity    Alcohol use: Never    Drug use: Never    Sexual activity: Not on file   Other Topics Concern    Not on file   Social History Narrative    Not on file     Social Determinants of Health     Financial Resource Strain: Not on file   Food Insecurity: Not on file   Transportation Needs: Not on file   Physical Activity: Not on file   Stress: Not on file   Social Connections: Not on file   Intimate Partner Violence: Not on file   Housing Stability: Not on file       Diagnoses and all orders for this visit:  Iron deficiency anemia, unspecified iron deficiency anemia type  -     Referral to Hematology and Oncology  -     CBC and Auto Differential; Future  -     Comprehensive Metabolic Panel; Future  -     Ferritin; Future  -     Iron and TIBC; Future  -     Folate; Future  -     Vitamin B12; Future  -     Magnesium; Future  -     Lactate Dehydrogenase; Future  -     Clinic Appointment Request; Future  -     CBC and Auto Differential; Future  -     Ferritin; Future  -     Iron and TIBC; Future  -     US abdomen complete; Future  -     Carcinoembryonic Antigen; Future  Other orders  -     ferumoxytol (Feraheme) 510 mg in sodium chloride 0.9% 117 mL IV  -     sodium chloride 0.9 % bolus 500 mL  -     dextrose 5 % in water (D5W) bolus  -     diphenhydrAMINE (BENADryl) injection 50 mg  -     methylPREDNISolone sod succinate (SOLU-Medrol) 40 mg/mL injection 40 mg  -     famotidine PF (Pepcid) injection 20 mg  -     EPINEPHrine (Epipen) injection syringe 0.3 mg  -      albuterol 2.5 mg /3 mL (0.083 %) nebulizer solution 3 mL       I spent more than 45 minutes for the patient today, including face-to-face conversation, pre-visit preparation, post-visit orders, and others.   Bella Jacobo MD

## 2024-07-18 LAB
ALBUMIN SERPL BCP-MCNC: 4.5 G/DL (ref 3.4–5)
ALP SERPL-CCNC: 55 U/L (ref 33–136)
ALT SERPL W P-5'-P-CCNC: 18 U/L (ref 7–45)
ANION GAP SERPL CALC-SCNC: 21 MMOL/L (ref 10–20)
AST SERPL W P-5'-P-CCNC: 21 U/L (ref 9–39)
BASOPHILS # BLD AUTO: 0.06 X10*3/UL (ref 0–0.1)
BASOPHILS NFR BLD AUTO: 0.5 %
BILIRUB SERPL-MCNC: 0.3 MG/DL (ref 0–1.2)
BUN SERPL-MCNC: 31 MG/DL (ref 6–23)
CALCIUM SERPL-MCNC: 10.6 MG/DL (ref 8.6–10.6)
CEA SERPL-MCNC: 0.9 UG/L
CHLORIDE SERPL-SCNC: 99 MMOL/L (ref 98–107)
CO2 SERPL-SCNC: 24 MMOL/L (ref 21–32)
CREAT SERPL-MCNC: 1.32 MG/DL (ref 0.5–1.05)
EGFRCR SERPLBLD CKD-EPI 2021: 46 ML/MIN/1.73M*2
EOSINOPHIL # BLD AUTO: 0.22 X10*3/UL (ref 0–0.7)
EOSINOPHIL NFR BLD AUTO: 1.9 %
ERYTHROCYTE [DISTWIDTH] IN BLOOD BY AUTOMATED COUNT: 18.2 % (ref 11.5–14.5)
FERRITIN SERPL-MCNC: 15 NG/ML (ref 8–150)
FOLATE SERPL-MCNC: 15.8 NG/ML
GLUCOSE SERPL-MCNC: 114 MG/DL (ref 74–99)
HCT VFR BLD AUTO: 35.1 % (ref 36–46)
HGB BLD-MCNC: 10.3 G/DL (ref 12–16)
IMM GRANULOCYTES # BLD AUTO: 0.1 X10*3/UL (ref 0–0.7)
IMM GRANULOCYTES NFR BLD AUTO: 0.9 % (ref 0–0.9)
IRON SATN MFR SERPL: ABNORMAL %
IRON SERPL-MCNC: 24 UG/DL (ref 35–150)
LDH SERPL L TO P-CCNC: 152 U/L (ref 84–246)
LYMPHOCYTES # BLD AUTO: 3.73 X10*3/UL (ref 1.2–4.8)
LYMPHOCYTES NFR BLD AUTO: 32 %
MAGNESIUM SERPL-MCNC: 1.67 MG/DL (ref 1.6–2.4)
MCH RBC QN AUTO: 21.8 PG (ref 26–34)
MCHC RBC AUTO-ENTMCNC: 29.3 G/DL (ref 32–36)
MCV RBC AUTO: 74 FL (ref 80–100)
MONOCYTES # BLD AUTO: 0.84 X10*3/UL (ref 0.1–1)
MONOCYTES NFR BLD AUTO: 7.2 %
NEUTROPHILS # BLD AUTO: 6.7 X10*3/UL (ref 1.2–7.7)
NEUTROPHILS NFR BLD AUTO: 57.5 %
NRBC BLD-RTO: 0 /100 WBCS (ref 0–0)
PLATELET # BLD AUTO: 538 X10*3/UL (ref 150–450)
POTASSIUM SERPL-SCNC: 4.6 MMOL/L (ref 3.5–5.3)
PROT SERPL-MCNC: 8.1 G/DL (ref 6.4–8.2)
RBC # BLD AUTO: 4.72 X10*6/UL (ref 4–5.2)
SODIUM SERPL-SCNC: 139 MMOL/L (ref 136–145)
TIBC SERPL-MCNC: ABNORMAL UG/DL
UIBC SERPL-MCNC: >450 UG/DL (ref 110–370)
VIT B12 SERPL-MCNC: 655 PG/ML (ref 211–911)
WBC # BLD AUTO: 11.7 X10*3/UL (ref 4.4–11.3)

## 2024-07-23 ENCOUNTER — APPOINTMENT (OUTPATIENT)
Dept: PRIMARY CARE | Facility: CLINIC | Age: 59
End: 2024-07-23
Payer: COMMERCIAL

## 2024-07-23 ENCOUNTER — HOSPITAL ENCOUNTER (OUTPATIENT)
Dept: RADIOLOGY | Facility: CLINIC | Age: 59
Discharge: HOME | End: 2024-07-23
Payer: COMMERCIAL

## 2024-07-23 ENCOUNTER — TELEPHONE (OUTPATIENT)
Dept: HEMATOLOGY/ONCOLOGY | Facility: CLINIC | Age: 59
End: 2024-07-23
Payer: COMMERCIAL

## 2024-07-23 ENCOUNTER — APPOINTMENT (OUTPATIENT)
Dept: HEMATOLOGY/ONCOLOGY | Facility: CLINIC | Age: 59
End: 2024-07-23
Payer: COMMERCIAL

## 2024-07-23 DIAGNOSIS — D50.9 IRON DEFICIENCY ANEMIA, UNSPECIFIED IRON DEFICIENCY ANEMIA TYPE: ICD-10-CM

## 2024-07-23 PROCEDURE — 76700 US EXAM ABDOM COMPLETE: CPT | Performed by: RADIOLOGY

## 2024-07-23 PROCEDURE — 76700 US EXAM ABDOM COMPLETE: CPT

## 2024-07-31 ENCOUNTER — INFUSION (OUTPATIENT)
Dept: HEMATOLOGY/ONCOLOGY | Facility: CLINIC | Age: 59
End: 2024-07-31
Payer: COMMERCIAL

## 2024-07-31 ENCOUNTER — TELEPHONE (OUTPATIENT)
Dept: HEMATOLOGY/ONCOLOGY | Facility: HOSPITAL | Age: 59
End: 2024-07-31
Payer: COMMERCIAL

## 2024-07-31 ENCOUNTER — OFFICE VISIT (OUTPATIENT)
Dept: PRIMARY CARE | Facility: CLINIC | Age: 59
End: 2024-07-31
Payer: COMMERCIAL

## 2024-07-31 VITALS
DIASTOLIC BLOOD PRESSURE: 70 MMHG | SYSTOLIC BLOOD PRESSURE: 130 MMHG | HEIGHT: 60 IN | OXYGEN SATURATION: 99 % | BODY MASS INDEX: 30.43 KG/M2 | WEIGHT: 155 LBS | HEART RATE: 92 BPM

## 2024-07-31 VITALS
SYSTOLIC BLOOD PRESSURE: 151 MMHG | OXYGEN SATURATION: 98 % | DIASTOLIC BLOOD PRESSURE: 83 MMHG | WEIGHT: 157.3 LBS | RESPIRATION RATE: 16 BRPM | HEART RATE: 106 BPM | BODY MASS INDEX: 30.72 KG/M2 | TEMPERATURE: 97.3 F

## 2024-07-31 DIAGNOSIS — R10.9 CHRONIC ABDOMINAL PAIN: ICD-10-CM

## 2024-07-31 DIAGNOSIS — G89.29 CHRONIC ABDOMINAL PAIN: ICD-10-CM

## 2024-07-31 DIAGNOSIS — D50.9 IRON DEFICIENCY ANEMIA, UNSPECIFIED IRON DEFICIENCY ANEMIA TYPE: ICD-10-CM

## 2024-07-31 DIAGNOSIS — B36.0 TINEA VERSICOLOR: Primary | ICD-10-CM

## 2024-07-31 DIAGNOSIS — E11.9 ALTERED METABOLISM DUE TO DIABETES (MULTI): ICD-10-CM

## 2024-07-31 PROCEDURE — 2500000004 HC RX 250 GENERAL PHARMACY W/ HCPCS (ALT 636 FOR OP/ED): Mod: SE | Performed by: INTERNAL MEDICINE

## 2024-07-31 PROCEDURE — 3075F SYST BP GE 130 - 139MM HG: CPT | Performed by: FAMILY MEDICINE

## 2024-07-31 PROCEDURE — 99214 OFFICE O/P EST MOD 30 MIN: CPT | Performed by: FAMILY MEDICINE

## 2024-07-31 PROCEDURE — 3008F BODY MASS INDEX DOCD: CPT | Performed by: FAMILY MEDICINE

## 2024-07-31 PROCEDURE — 96365 THER/PROPH/DIAG IV INF INIT: CPT | Mod: INF

## 2024-07-31 PROCEDURE — 3078F DIAST BP <80 MM HG: CPT | Performed by: FAMILY MEDICINE

## 2024-07-31 PROCEDURE — 1036F TOBACCO NON-USER: CPT | Performed by: FAMILY MEDICINE

## 2024-07-31 RX ORDER — EPINEPHRINE 0.3 MG/.3ML
0.3 INJECTION SUBCUTANEOUS EVERY 5 MIN PRN
OUTPATIENT
Start: 2024-08-04

## 2024-07-31 RX ORDER — TRAMADOL HYDROCHLORIDE 50 MG/1
50 TABLET ORAL EVERY 8 HOURS PRN
Qty: 90 TABLET | Refills: 2 | Status: SHIPPED | OUTPATIENT
Start: 2024-07-31 | End: 2024-10-29

## 2024-07-31 RX ORDER — DIPHENHYDRAMINE HYDROCHLORIDE 50 MG/ML
50 INJECTION INTRAMUSCULAR; INTRAVENOUS AS NEEDED
OUTPATIENT
Start: 2024-08-04

## 2024-07-31 RX ORDER — HEPARIN SODIUM,PORCINE/PF 10 UNIT/ML
50 SYRINGE (ML) INTRAVENOUS AS NEEDED
OUTPATIENT
Start: 2024-07-31

## 2024-07-31 RX ORDER — ALBUTEROL SULFATE 0.83 MG/ML
3 SOLUTION RESPIRATORY (INHALATION) AS NEEDED
OUTPATIENT
Start: 2024-08-04

## 2024-07-31 RX ORDER — HEPARIN 100 UNIT/ML
500 SYRINGE INTRAVENOUS AS NEEDED
OUTPATIENT
Start: 2024-07-31

## 2024-07-31 RX ORDER — CLOTRIMAZOLE 1 %
CREAM (GRAM) TOPICAL 2 TIMES DAILY
Qty: 30 G | Refills: 1 | Status: SHIPPED | OUTPATIENT
Start: 2024-07-31 | End: 2024-11-28

## 2024-07-31 RX ORDER — FAMOTIDINE 10 MG/ML
20 INJECTION INTRAVENOUS ONCE AS NEEDED
OUTPATIENT
Start: 2024-08-04

## 2024-07-31 ASSESSMENT — PAIN SCALES - GENERAL
PAINLEVEL: 5
PAINLEVEL: 5

## 2024-07-31 ASSESSMENT — ENCOUNTER SYMPTOMS
ARTHRALGIAS: 1
CONSTIPATION: 1

## 2024-07-31 NOTE — PROGRESS NOTES
"[facility]: [facility]  E/M EVALUATION    Caty Lal is a 59 y.o. female who presents for Rash (neck).    Subjective   Rash right neck- present for about 1 month    DM- stable.  Due for labs    Chronic pain-  here for labs.  UDS 4/24.      Anemia- having infusion today.      Rash    Review of Systems   Gastrointestinal:  Positive for constipation.   Musculoskeletal:  Positive for arthralgias.   Skin:  Positive for rash.       Objective   Vitals:    07/31/24 0854   BP: 130/70   Pulse: 92   SpO2: 99%     Physical Exam  Constitutional:       Appearance: Normal appearance.   Cardiovascular:      Rate and Rhythm: Normal rate and regular rhythm.      Heart sounds: No murmur heard.  Pulmonary:      Effort: Pulmonary effort is normal.      Breath sounds: Normal breath sounds.   Musculoskeletal:      Right lower leg: No edema.      Left lower leg: No edema.   Skin:     Comments: Faint patch right neck, mild scale.    Neurological:      Mental Status: She is alert.     Cholesterol   Date Value Ref Range Status   07/25/2023 251 (H) 133 - 200 MG/DL Final     Triglycerides   Date Value Ref Range Status   07/25/2023 234 (H) 40 - 150 MG/DL Final     HDL   Date Value Ref Range Status   07/25/2023 51 >50 MG/DL Final     Comment:     National Cholesterol Education Program(NCFP)guidelines:   <40 mg/dl:Low HDL-cholesterol(major risk factor for CHD)   >60 mg/dl:High HDL-cholesterol(\"negative\"risk factor for CHD)   HDL-cholesterol is affected by a number of factors,e.g.,smoking,  exercise,hormones,sex and age.       LDL Calculated   Date Value Ref Range Status   07/25/2023 153 (H) 65 - 130 MG/DL Final     Cholesterol/HDL Ratio   Date Value Ref Range Status   07/25/2023 4.9 RATIO Final     Comment:     According to the American Heart Association, the goal is to maintain   the total cholesterol/HDL ratio at 5-to-1 or lower with an optimum   ratio of 3.5-to-1.  Performed at [address] " [place] [id number]       Glucose   Date Value Ref Range Status   07/17/2024 114 (H) 74 - 99 mg/dL Final     Sodium   Date Value Ref Range Status   07/17/2024 139 136 - 145 mmol/L Final     Potassium   Date Value Ref Range Status   07/17/2024 4.6 3.5 - 5.3 mmol/L Final     ALT   Date Value Ref Range Status   07/17/2024 18 7 - 45 U/L Final     Comment:     Patients treated with Sulfasalazine may generate falsely decreased results for ALT.     eGFR   Date Value Ref Range Status   07/17/2024 46 (L) >60 mL/min/1.73m*2 Final     Comment:     Calculations of estimated GFR are performed using the 2021 CKD-EPI Study Refit equation without the race variable for the IDMS-Traceable creatinine methods.  https://jasn.asnjournals.org/content/early/2021/09/22/ASN.8350632537     POC HEMOGLOBIN A1c   Date Value Ref Range Status   02/07/2024 7.6 (A) 4.2 - 6.5 % Final     Thyroid Stimulating Hormone   Date Value Ref Range Status   02/09/2018 0.38 0.27 - 4.20 MIU/L Final     Comment:     THIS TSH ASSAY HAS A FUNCTIONAL SENSITIVITY OF 0.01 MIU/L. THIS MEETS   REQUIREMENTS OF A 3RD GENERATION HIGH SENSITIVE TSH ASSAY.  Performed at [place] [id number]         Assessment/Plan      Patient Active Problem List   Diagnosis    Iron deficiency anemia       Diagnoses and all orders for this visit:  Tinea versicolor  -     clotrimazole (Lotrimin) 1 % cream; Apply topically 2 times a day. apply to affected area  Altered metabolism due to diabetes (Multi)  -     Hemoglobin A1C; Future  -     Fructosamine; Future  Chronic abdominal pain  -     traMADol (Ultram) 50 mg tablet; Take 1 tablet (50 mg) by mouth every 8 hours if needed for severe pain (7 - 10).  OARRS    The patient was encouraged to ensure that any/all documentation is accurate and up to date, and that our office be provided a copy in the event that anything changes.         Cj Ji MD

## 2024-07-31 NOTE — PROGRESS NOTES
Patient in infusion for first dose Venofer(300mg). Received first dose with no complications and VSS after 30 minute observation.

## 2024-07-31 NOTE — TELEPHONE ENCOUNTER
US shows fatty liver and a small kidney stone. Nothing significant per Dr. Jacobo secure chat      Spoke with patient, notified of her US results. Verbalized understanding. No further questions.

## 2024-08-02 ENCOUNTER — TELEPHONE (OUTPATIENT)
Dept: PRIMARY CARE | Facility: CLINIC | Age: 59
End: 2024-08-02
Payer: COMMERCIAL

## 2024-08-02 DIAGNOSIS — N20.0 KIDNEY STONE: Primary | ICD-10-CM

## 2024-08-02 RX ORDER — KETOROLAC TROMETHAMINE 10 MG/1
10 TABLET, FILM COATED ORAL 4 TIMES DAILY PRN
Qty: 20 TABLET | Refills: 0 | Status: SHIPPED | OUTPATIENT
Start: 2024-08-02 | End: 2024-08-07

## 2024-08-02 NOTE — TELEPHONE ENCOUNTER
[facility] called to verify - pt picked up Tramadol 50mg x7 days ago - Per Dr. Ji's nurse; instructed to hold current Rx as patient recently filled

## 2024-08-02 NOTE — TELEPHONE ENCOUNTER
Patient called requesting Tramadol be sent to the pharmacy to help with her pain from the kidney stones, she thinks they are moving bc it is very painful.   --  [id number] - [place] - [address]  [address] [address]  Phone: [phone]  Fax: [phone]   --   Last seen 07/31/24  Next appt 10/30/24

## 2024-08-08 ENCOUNTER — INFUSION (OUTPATIENT)
Dept: HEMATOLOGY/ONCOLOGY | Facility: CLINIC | Age: 59
End: 2024-08-08
Payer: COMMERCIAL

## 2024-08-08 VITALS
DIASTOLIC BLOOD PRESSURE: 73 MMHG | WEIGHT: 151.46 LBS | RESPIRATION RATE: 18 BRPM | HEART RATE: 91 BPM | BODY MASS INDEX: 29.58 KG/M2 | OXYGEN SATURATION: 97 % | SYSTOLIC BLOOD PRESSURE: 120 MMHG | TEMPERATURE: 96.8 F

## 2024-08-08 DIAGNOSIS — D50.9 IRON DEFICIENCY ANEMIA, UNSPECIFIED IRON DEFICIENCY ANEMIA TYPE: ICD-10-CM

## 2024-08-08 PROCEDURE — 96365 THER/PROPH/DIAG IV INF INIT: CPT | Mod: INF

## 2024-08-08 PROCEDURE — 2500000004 HC RX 250 GENERAL PHARMACY W/ HCPCS (ALT 636 FOR OP/ED): Mod: SE | Performed by: INTERNAL MEDICINE

## 2024-08-08 RX ORDER — FAMOTIDINE 10 MG/ML
20 INJECTION INTRAVENOUS ONCE AS NEEDED
OUTPATIENT
Start: 2024-08-12

## 2024-08-08 RX ORDER — DIPHENHYDRAMINE HYDROCHLORIDE 50 MG/ML
50 INJECTION INTRAMUSCULAR; INTRAVENOUS AS NEEDED
OUTPATIENT
Start: 2024-08-12

## 2024-08-08 RX ORDER — HEPARIN SODIUM,PORCINE/PF 10 UNIT/ML
50 SYRINGE (ML) INTRAVENOUS AS NEEDED
OUTPATIENT
Start: 2024-08-08

## 2024-08-08 RX ORDER — HEPARIN 100 UNIT/ML
500 SYRINGE INTRAVENOUS AS NEEDED
OUTPATIENT
Start: 2024-08-08

## 2024-08-08 RX ORDER — EPINEPHRINE 0.3 MG/.3ML
0.3 INJECTION SUBCUTANEOUS EVERY 5 MIN PRN
Status: DISCONTINUED | OUTPATIENT
Start: 2024-08-08 | End: 2024-08-08 | Stop reason: HOSPADM

## 2024-08-08 RX ORDER — ALBUTEROL SULFATE 0.83 MG/ML
3 SOLUTION RESPIRATORY (INHALATION) AS NEEDED
OUTPATIENT
Start: 2024-08-12

## 2024-08-08 RX ORDER — DIPHENHYDRAMINE HYDROCHLORIDE 50 MG/ML
50 INJECTION INTRAMUSCULAR; INTRAVENOUS AS NEEDED
Status: DISCONTINUED | OUTPATIENT
Start: 2024-08-08 | End: 2024-08-08 | Stop reason: HOSPADM

## 2024-08-08 RX ORDER — ALBUTEROL SULFATE 0.83 MG/ML
3 SOLUTION RESPIRATORY (INHALATION) AS NEEDED
Status: DISCONTINUED | OUTPATIENT
Start: 2024-08-08 | End: 2024-08-08 | Stop reason: HOSPADM

## 2024-08-08 RX ORDER — EPINEPHRINE 0.3 MG/.3ML
0.3 INJECTION SUBCUTANEOUS EVERY 5 MIN PRN
OUTPATIENT
Start: 2024-08-12

## 2024-08-08 RX ORDER — FAMOTIDINE 10 MG/ML
20 INJECTION INTRAVENOUS ONCE AS NEEDED
Status: DISCONTINUED | OUTPATIENT
Start: 2024-08-08 | End: 2024-08-08 | Stop reason: HOSPADM

## 2024-08-08 ASSESSMENT — PAIN SCALES - GENERAL: PAINLEVEL: 6

## 2024-08-08 NOTE — PROGRESS NOTES
pt tolerated today's Venofer infusions without difficulty. VS stable pos infusion and pt monitored for 30 minutes. Pt aware of upcoming appt schedule and will call with any questions and/or concerns.

## 2024-08-15 ENCOUNTER — INFUSION (OUTPATIENT)
Dept: HEMATOLOGY/ONCOLOGY | Facility: CLINIC | Age: 59
End: 2024-08-15
Payer: COMMERCIAL

## 2024-08-15 VITALS
SYSTOLIC BLOOD PRESSURE: 146 MMHG | WEIGHT: 152.78 LBS | DIASTOLIC BLOOD PRESSURE: 78 MMHG | TEMPERATURE: 97.2 F | HEART RATE: 82 BPM | RESPIRATION RATE: 18 BRPM | BODY MASS INDEX: 29.84 KG/M2 | OXYGEN SATURATION: 98 %

## 2024-08-15 DIAGNOSIS — D50.9 IRON DEFICIENCY ANEMIA, UNSPECIFIED IRON DEFICIENCY ANEMIA TYPE: ICD-10-CM

## 2024-08-15 PROCEDURE — 96365 THER/PROPH/DIAG IV INF INIT: CPT | Mod: INF

## 2024-08-15 PROCEDURE — 2500000004 HC RX 250 GENERAL PHARMACY W/ HCPCS (ALT 636 FOR OP/ED): Mod: SE | Performed by: INTERNAL MEDICINE

## 2024-08-15 RX ORDER — FAMOTIDINE 10 MG/ML
20 INJECTION INTRAVENOUS ONCE AS NEEDED
OUTPATIENT
Start: 2024-08-16

## 2024-08-15 RX ORDER — ALBUTEROL SULFATE 0.83 MG/ML
3 SOLUTION RESPIRATORY (INHALATION) AS NEEDED
OUTPATIENT
Start: 2024-08-16

## 2024-08-15 RX ORDER — DIPHENHYDRAMINE HYDROCHLORIDE 50 MG/ML
50 INJECTION INTRAMUSCULAR; INTRAVENOUS AS NEEDED
OUTPATIENT
Start: 2024-08-16

## 2024-08-15 RX ORDER — EPINEPHRINE 0.3 MG/.3ML
0.3 INJECTION SUBCUTANEOUS EVERY 5 MIN PRN
OUTPATIENT
Start: 2024-08-16

## 2024-08-15 ASSESSMENT — PAIN SCALES - GENERAL: PAINLEVEL: 5

## 2024-09-18 ENCOUNTER — APPOINTMENT (OUTPATIENT)
Dept: HEMATOLOGY/ONCOLOGY | Facility: HOSPITAL | Age: 59
End: 2024-09-18
Payer: COMMERCIAL

## 2024-09-20 DIAGNOSIS — E11.9 TYPE 2 DIABETES MELLITUS WITHOUT COMPLICATIONS (MULTI): ICD-10-CM

## 2024-09-20 RX ORDER — BLOOD-GLUCOSE METER
KIT MISCELLANEOUS
Qty: 100 STRIP | Refills: 11 | Status: SHIPPED | OUTPATIENT
Start: 2024-09-20

## 2024-09-23 ENCOUNTER — TELEPHONE (OUTPATIENT)
Dept: HEMATOLOGY/ONCOLOGY | Facility: HOSPITAL | Age: 59
End: 2024-09-23
Payer: COMMERCIAL

## 2024-09-23 DIAGNOSIS — E11.9 ALTERED METABOLISM DUE TO DIABETES (MULTI): Primary | ICD-10-CM

## 2024-09-23 RX ORDER — CALCIUM CITRATE/VITAMIN D3 200MG-6.25
1 TABLET ORAL DAILY
Qty: 100 STRIP | Refills: 3 | Status: SHIPPED | OUTPATIENT
Start: 2024-09-23 | End: 2025-09-23

## 2024-09-23 NOTE — TELEPHONE ENCOUNTER
Contacted patient to remind her to have her lab work drawn prior to her visit with Dr. ELMIRA Jacobo this week. Patient verbalized understanding.

## 2024-09-23 NOTE — TELEPHONE ENCOUNTER
Received refill request from [facility] True metrix glucose meter and True metrix glucose test strips.  Pt last seen 7/31/2024.

## 2024-09-25 ENCOUNTER — LAB (OUTPATIENT)
Dept: LAB | Facility: LAB | Age: 59
End: 2024-09-25
Payer: COMMERCIAL

## 2024-09-25 ENCOUNTER — OFFICE VISIT (OUTPATIENT)
Dept: HEMATOLOGY/ONCOLOGY | Facility: HOSPITAL | Age: 59
End: 2024-09-25
Payer: COMMERCIAL

## 2024-09-25 VITALS
WEIGHT: 155.98 LBS | SYSTOLIC BLOOD PRESSURE: 135 MMHG | OXYGEN SATURATION: 97 % | RESPIRATION RATE: 16 BRPM | HEIGHT: 60 IN | HEART RATE: 87 BPM | BODY MASS INDEX: 30.62 KG/M2 | TEMPERATURE: 96.8 F | DIASTOLIC BLOOD PRESSURE: 85 MMHG

## 2024-09-25 DIAGNOSIS — D50.9 IRON DEFICIENCY ANEMIA, UNSPECIFIED IRON DEFICIENCY ANEMIA TYPE: ICD-10-CM

## 2024-09-25 DIAGNOSIS — G89.29 CHRONIC ABDOMINAL PAIN: ICD-10-CM

## 2024-09-25 DIAGNOSIS — E11.9 ALTERED METABOLISM DUE TO DIABETES (MULTI): ICD-10-CM

## 2024-09-25 DIAGNOSIS — D50.9 IRON DEFICIENCY ANEMIA, UNSPECIFIED IRON DEFICIENCY ANEMIA TYPE: Primary | ICD-10-CM

## 2024-09-25 DIAGNOSIS — R10.13 DYSPEPSIA: ICD-10-CM

## 2024-09-25 DIAGNOSIS — R10.9 CHRONIC ABDOMINAL PAIN: ICD-10-CM

## 2024-09-25 LAB
BASOPHILS # BLD AUTO: 0.05 X10*3/UL (ref 0–0.1)
BASOPHILS # BLD AUTO: 0.05 X10*3/UL (ref 0–0.1)
BASOPHILS NFR BLD AUTO: 0.5 %
BASOPHILS NFR BLD AUTO: 0.5 %
EOSINOPHIL # BLD AUTO: 0.13 X10*3/UL (ref 0–0.7)
EOSINOPHIL # BLD AUTO: 0.18 X10*3/UL (ref 0–0.7)
EOSINOPHIL NFR BLD AUTO: 1.2 %
EOSINOPHIL NFR BLD AUTO: 1.9 %
ERYTHROCYTE [DISTWIDTH] IN BLOOD BY AUTOMATED COUNT: 21 % (ref 11.5–14.5)
ERYTHROCYTE [DISTWIDTH] IN BLOOD BY AUTOMATED COUNT: 21.1 % (ref 11.5–14.5)
FERRITIN SERPL-MCNC: 112 NG/ML (ref 8–150)
HCT VFR BLD AUTO: 40.5 % (ref 36–46)
HCT VFR BLD AUTO: 40.5 % (ref 36–46)
HGB BLD-MCNC: 12.5 G/DL (ref 12–16)
HGB BLD-MCNC: 12.5 G/DL (ref 12–16)
IMM GRANULOCYTES # BLD AUTO: 0.09 X10*3/UL (ref 0–0.7)
IMM GRANULOCYTES # BLD AUTO: 0.11 X10*3/UL (ref 0–0.7)
IMM GRANULOCYTES NFR BLD AUTO: 0.9 % (ref 0–0.9)
IMM GRANULOCYTES NFR BLD AUTO: 1 % (ref 0–0.9)
IRON SATN MFR SERPL: 11 % (ref 25–45)
IRON SERPL-MCNC: 45 UG/DL (ref 35–150)
LYMPHOCYTES # BLD AUTO: 3.04 X10*3/UL (ref 1.2–4.8)
LYMPHOCYTES # BLD AUTO: 3.52 X10*3/UL (ref 1.2–4.8)
LYMPHOCYTES NFR BLD AUTO: 31.4 %
LYMPHOCYTES NFR BLD AUTO: 33.3 %
MCH RBC QN AUTO: 25.8 PG (ref 26–34)
MCH RBC QN AUTO: 25.9 PG (ref 26–34)
MCHC RBC AUTO-ENTMCNC: 30.9 G/DL (ref 32–36)
MCHC RBC AUTO-ENTMCNC: 30.9 G/DL (ref 32–36)
MCV RBC AUTO: 84 FL (ref 80–100)
MCV RBC AUTO: 84 FL (ref 80–100)
MONOCYTES # BLD AUTO: 0.51 X10*3/UL (ref 0.1–1)
MONOCYTES # BLD AUTO: 0.59 X10*3/UL (ref 0.1–1)
MONOCYTES NFR BLD AUTO: 5.3 %
MONOCYTES NFR BLD AUTO: 5.6 %
NEUTROPHILS # BLD AUTO: 5.82 X10*3/UL (ref 1.2–7.7)
NEUTROPHILS # BLD AUTO: 6.16 X10*3/UL (ref 1.2–7.7)
NEUTROPHILS NFR BLD AUTO: 58.4 %
NEUTROPHILS NFR BLD AUTO: 60 %
NRBC BLD-RTO: 0 /100 WBCS (ref 0–0)
NRBC BLD-RTO: 0 /100 WBCS (ref 0–0)
PLATELET # BLD AUTO: 395 X10*3/UL (ref 150–450)
PLATELET # BLD AUTO: 420 X10*3/UL (ref 150–450)
RBC # BLD AUTO: 4.82 X10*6/UL (ref 4–5.2)
RBC # BLD AUTO: 4.84 X10*6/UL (ref 4–5.2)
RBC MORPH BLD: NORMAL
RBC MORPH BLD: NORMAL
TIBC SERPL-MCNC: 421 UG/DL (ref 240–445)
UIBC SERPL-MCNC: 376 UG/DL (ref 110–370)
WBC # BLD AUTO: 10.6 X10*3/UL (ref 4.4–11.3)
WBC # BLD AUTO: 9.7 X10*3/UL (ref 4.4–11.3)

## 2024-09-25 PROCEDURE — 99214 OFFICE O/P EST MOD 30 MIN: CPT | Performed by: INTERNAL MEDICINE

## 2024-09-25 PROCEDURE — 85025 COMPLETE CBC W/AUTO DIFF WBC: CPT

## 2024-09-25 PROCEDURE — 83036 HEMOGLOBIN GLYCOSYLATED A1C: CPT

## 2024-09-25 PROCEDURE — 3008F BODY MASS INDEX DOCD: CPT | Performed by: INTERNAL MEDICINE

## 2024-09-25 PROCEDURE — 80373 DRUG SCREENING TRAMADOL: CPT

## 2024-09-25 PROCEDURE — 83540 ASSAY OF IRON: CPT

## 2024-09-25 PROCEDURE — 82728 ASSAY OF FERRITIN: CPT

## 2024-09-25 PROCEDURE — 82985 ASSAY OF GLYCATED PROTEIN: CPT

## 2024-09-25 PROCEDURE — 83550 IRON BINDING TEST: CPT

## 2024-09-25 PROCEDURE — 36415 COLL VENOUS BLD VENIPUNCTURE: CPT

## 2024-09-25 RX ORDER — EPINEPHRINE 0.3 MG/.3ML
0.3 INJECTION SUBCUTANEOUS EVERY 5 MIN PRN
OUTPATIENT
Start: 2024-10-01

## 2024-09-25 RX ORDER — ALBUTEROL SULFATE 0.83 MG/ML
3 SOLUTION RESPIRATORY (INHALATION) AS NEEDED
OUTPATIENT
Start: 2024-10-01

## 2024-09-25 RX ORDER — FAMOTIDINE 10 MG/ML
20 INJECTION INTRAVENOUS ONCE AS NEEDED
OUTPATIENT
Start: 2024-10-01

## 2024-09-25 RX ORDER — ONDANSETRON 8 MG/1
8 TABLET, ORALLY DISINTEGRATING ORAL EVERY 8 HOURS PRN
COMMUNITY

## 2024-09-25 RX ORDER — DIPHENHYDRAMINE HYDROCHLORIDE 50 MG/ML
50 INJECTION INTRAMUSCULAR; INTRAVENOUS AS NEEDED
OUTPATIENT
Start: 2024-10-01

## 2024-09-25 ASSESSMENT — ENCOUNTER SYMPTOMS
RESPIRATORY NEGATIVE: 1
FEVER: 0
FATIGUE: 1
CARDIOVASCULAR NEGATIVE: 1
UNEXPECTED WEIGHT CHANGE: 0
ABDOMINAL PAIN: 1
MYALGIAS: 1

## 2024-09-25 ASSESSMENT — PAIN SCALES - GENERAL: PAINLEVEL: 5

## 2024-09-25 NOTE — PROGRESS NOTES
Patient ID: Caty Lal is a 59 y.o. female.  Referring Physician: Bella Jacobo MD  [address] [place] [address]  Primary Care Provider: Cj Ji MD  Referral Reason: Iron deficiency anemia    Subjective:  Returns for follow up for iron deficiency anemia. My initial note on 7/17/24 is as follows:  Referred for iron deficiency anemia. Has had iron deficiency since mid 2023. Has had EGD and colonoscopy in Dec 2023 that were both neg for mass or bleeding. Pica++. Hair loss and brittle nails. Says she occaionally has tarry black stools. Has had kidney stones before and intermittently has hematuria. Has been on PO iron since Feb 2024.   Occasional LUQ abd pain.     Today, she says she feels better after IV iron but recently started getting tired again. Denies pica.     Assessment/Plan:  ? Iron deficiency anemia: Hb has been around 9. Iron sat and ferritin were both low. Had fatigue, pica, and hair loss. po iron has been ineffective and caused significant GI side effects before. Already had GI work-up which were neg in Oct 2023. CT abd/pel in Mar 2023 were neg except kidney stones. Has FH of colon cancer. CEA and US Abd in Aug 2024 were OK.   S/p Venofer 300 mg x 3 in August 2024.   Her iron saturation is still low and she still feels somewhat tired - although improved after iron infusions => Will give her one more Venofer 300 mg iv. Then plan to repeat labs in 4 months. She does not want to take PO iron due to GI side effects.     Review Of Systems:  Review of Systems   Constitutional:  Positive for fatigue. Negative for fever and unexpected weight change.   HENT:  Negative.     Respiratory: Negative.     Cardiovascular: Negative.    Gastrointestinal:  Positive for abdominal pain.   Genitourinary: Negative.     Musculoskeletal:  Positive for myalgias.       Physical Exam:  /85 (BP Location: Left arm, Patient Position: Sitting, BP Cuff Size: Adult)   Pulse 87   Temp 36 °C (96.8 °F)  (Temporal)   Resp 16   Ht 1.524 m (5')   Wt 70.8 kg (155 lb 15.6 oz)   SpO2 97%   BMI 30.46 kg/m²   BSA: 1.73 meters squared  Performance Status: Asymptomatic  Physical Exam  Cardiovascular:      Rate and Rhythm: Normal rate and regular rhythm.   Pulmonary:      Effort: Pulmonary effort is normal.   Abdominal:      General: Abdomen is flat.      Palpations: Abdomen is soft.      Tenderness: Tenderness: LUQ tenderness.   Skin:     Coloration: Skin is not jaundiced.   Neurological:      General: No focal deficit present.      Mental Status: She is alert and oriented to person, place, and time.         Results:  Diagnostic Results   Lab Results   Component Value Date    WBC 9.7 09/25/2024    HGB 12.5 09/25/2024    HCT 40.5 09/25/2024    MCV 84 09/25/2024     09/25/2024     Lab Results   Component Value Date    CALCIUM 10.6 07/17/2024     07/17/2024    K 4.6 07/17/2024    CO2 24 07/17/2024    CL 99 07/17/2024    BUN 31 (H) 07/17/2024    CREATININE 1.32 (H) 07/17/2024    ALT 18 07/17/2024    AST 21 07/17/2024       Current Outpatient Medications:     amLODIPine (Norvasc) 10 mg tablet, TAKE 1 TABLET BY MOUTH EVERY DAY, Disp: 30 tablet, Rfl: 11    blood sugar diagnostic (True Metrix Glucose Test Strip) strip, 1 strip once daily., Disp: 100 strip, Rfl: 3    clotrimazole (Lotrimin) 1 % cream, Apply topically 2 times a day. apply to affected area, Disp: 30 g, Rfl: 1    FreeStyle Lite Strips strip, test AS DIRECTED TWICE DAILY, Disp: 100 strip, Rfl: 11    insulin glargine (Lantus) 100 unit/mL injection, Inject 20 Units under the skin once daily in the morning AND 16 Units once daily at bedtime. Take as directed per insulin instructions., Disp: , Rfl:     metFORMIN (Glucophage) 1,000 mg tablet, TAKE 1 TABLET BY MOUTH TWICE DAILY with a meal, Disp: 180 tablet, Rfl: 3    naproxen (Naprosyn) 500 mg tablet, TAKE 1 TABLET BY MOUTH EVERY 12 HOURS WITH FOOD or milk AS NEEDED, Disp: 60 tablet, Rfl: 2    omeprazole  (PriLOSEC) 40 mg DR capsule, Take 1 capsule (40 mg) by mouth 2 times a day., Disp: 180 capsule, Rfl: 3    ondansetron ODT (Zofran-ODT) 8 mg disintegrating tablet, Take 1 tablet (8 mg) by mouth every 8 hours if needed for nausea or vomiting., Disp: , Rfl:     spironolacton-hydrochlorothiaz (Aldactazide) 25-25 mg tablet, Take 1 tablet (25 mg) by mouth once daily., Disp: 30 tablet, Rfl: 11    traMADol (Ultram) 50 mg tablet, Take 1 tablet (50 mg) by mouth every 8 hours if needed for severe pain (7 - 10)., Disp: 90 tablet, Rfl: 2     No past surgical history on file.  No family history on file.   reports that she has never smoked. She has never been exposed to tobacco smoke. She has never used smokeless tobacco.  Social History     Socioeconomic History    Marital status:      Spouse name: Not on file    Number of children: Not on file    Years of education: Not on file    Highest education level: Not on file   Occupational History    Not on file   Tobacco Use    Smoking status: Never     Passive exposure: Never    Smokeless tobacco: Never   Substance and Sexual Activity    Alcohol use: Never    Drug use: Never    Sexual activity: Not on file   Other Topics Concern    Not on file   Social History Narrative    Not on file     Social Determinants of Health     Financial Resource Strain: Not on file   Food Insecurity: Not on file   Transportation Needs: Not on file   Physical Activity: Not on file   Stress: Not on file   Social Connections: Not on file   Intimate Partner Violence: Not on file   Housing Stability: Not on file       Diagnoses and all orders for this visit:  Iron deficiency anemia, unspecified iron deficiency anemia type  -     Clinic Appointment Request  Other orders  -     iron sucrose (Venofer) 300 mg in sodium chloride 0.9% 250 mL IV  -     sodium chloride 0.9 % bolus 500 mL  -     dextrose 5 % in water (D5W) bolus  -     diphenhydrAMINE (BENADryl) injection 50 mg  -     methylPREDNISolone sod  succinate (SOLU-Medrol) 40 mg/mL injection 40 mg  -     famotidine PF (Pepcid) injection 20 mg  -     EPINEPHrine (Epipen) injection syringe 0.3 mg  -     albuterol 2.5 mg /3 mL (0.083 %) nebulizer solution 3 mL       I spent more than 30 minutes for the patient today, including face-to-face conversation, pre-visit preparation, post-visit orders, and others.   Bella Jacobo MD

## 2024-09-26 ENCOUNTER — TELEPHONE (OUTPATIENT)
Dept: PRIMARY CARE | Facility: CLINIC | Age: 59
End: 2024-09-26
Payer: COMMERCIAL

## 2024-09-26 LAB
EST. AVERAGE GLUCOSE BLD GHB EST-MCNC: 120 MG/DL
HBA1C MFR BLD: 5.8 %

## 2024-09-26 NOTE — TELEPHONE ENCOUNTER
[place] from [facility]  lab called and said she had cancelled labs for the patient - she asked for a callback. I called back and left a message informing her we received her message and to call us back if she had any other concerns.

## 2024-09-27 LAB — FRUCTOSAMINE SERPL-SCNC: 278 UMOL/L (ref 205–285)

## 2024-09-30 LAB
NORTRAMADOL UR-MCNC: >1000 NG/ML
TRAMADOL UR CFM-MCNC: >1000 NG/ML

## 2024-10-02 ENCOUNTER — APPOINTMENT (OUTPATIENT)
Dept: HEMATOLOGY/ONCOLOGY | Facility: HOSPITAL | Age: 59
End: 2024-10-02
Payer: COMMERCIAL

## 2024-10-08 ENCOUNTER — APPOINTMENT (OUTPATIENT)
Dept: HEMATOLOGY/ONCOLOGY | Facility: HOSPITAL | Age: 59
End: 2024-10-08
Payer: COMMERCIAL

## 2024-10-10 ENCOUNTER — INFUSION (OUTPATIENT)
Dept: HEMATOLOGY/ONCOLOGY | Facility: HOSPITAL | Age: 59
End: 2024-10-10
Payer: COMMERCIAL

## 2024-10-10 VITALS
TEMPERATURE: 97.2 F | SYSTOLIC BLOOD PRESSURE: 150 MMHG | OXYGEN SATURATION: 97 % | HEART RATE: 88 BPM | RESPIRATION RATE: 16 BRPM | DIASTOLIC BLOOD PRESSURE: 80 MMHG

## 2024-10-10 DIAGNOSIS — D50.9 IRON DEFICIENCY ANEMIA, UNSPECIFIED IRON DEFICIENCY ANEMIA TYPE: ICD-10-CM

## 2024-10-10 DIAGNOSIS — E11.9 ALTERED METABOLISM DUE TO DIABETES (MULTI): Primary | ICD-10-CM

## 2024-10-10 DIAGNOSIS — R10.9 SIDE PAIN: ICD-10-CM

## 2024-10-10 LAB
APPEARANCE UR: CLEAR
BILIRUB UR STRIP.AUTO-MCNC: NEGATIVE MG/DL
COLOR UR: ABNORMAL
GLUCOSE UR STRIP.AUTO-MCNC: NORMAL MG/DL
HYALINE CASTS #/AREA URNS AUTO: ABNORMAL /LPF
KETONES UR STRIP.AUTO-MCNC: NEGATIVE MG/DL
LEUKOCYTE ESTERASE UR QL STRIP.AUTO: ABNORMAL
MUCOUS THREADS #/AREA URNS AUTO: ABNORMAL /LPF
NITRITE UR QL STRIP.AUTO: NEGATIVE
PH UR STRIP.AUTO: 5.5 [PH]
PROT UR STRIP.AUTO-MCNC: NEGATIVE MG/DL
RBC # UR STRIP.AUTO: NEGATIVE /UL
RBC #/AREA URNS AUTO: ABNORMAL /HPF
SP GR UR STRIP.AUTO: 1.01
SQUAMOUS #/AREA URNS AUTO: ABNORMAL /HPF
TRANS CELLS #/AREA UR COMP ASSIST: ABNORMAL /HPF
UROBILINOGEN UR STRIP.AUTO-MCNC: NORMAL MG/DL
WBC #/AREA URNS AUTO: ABNORMAL /HPF

## 2024-10-10 PROCEDURE — 2500000004 HC RX 250 GENERAL PHARMACY W/ HCPCS (ALT 636 FOR OP/ED): Mod: SE | Performed by: INTERNAL MEDICINE

## 2024-10-10 PROCEDURE — 81001 URINALYSIS AUTO W/SCOPE: CPT

## 2024-10-10 PROCEDURE — 96365 THER/PROPH/DIAG IV INF INIT: CPT | Mod: INF

## 2024-10-10 RX ORDER — FAMOTIDINE 10 MG/ML
20 INJECTION INTRAVENOUS ONCE AS NEEDED
Status: DISCONTINUED | OUTPATIENT
Start: 2024-10-10 | End: 2024-10-10 | Stop reason: HOSPADM

## 2024-10-10 RX ORDER — DIPHENHYDRAMINE HYDROCHLORIDE 50 MG/ML
50 INJECTION INTRAMUSCULAR; INTRAVENOUS AS NEEDED
Status: DISCONTINUED | OUTPATIENT
Start: 2024-10-10 | End: 2024-10-10 | Stop reason: HOSPADM

## 2024-10-10 RX ORDER — LANCETS 33 GAUGE
EACH MISCELLANEOUS
Qty: 100 EACH | Refills: 0 | Status: SHIPPED | OUTPATIENT
Start: 2024-10-10

## 2024-10-10 RX ORDER — ALBUTEROL SULFATE 0.83 MG/ML
3 SOLUTION RESPIRATORY (INHALATION) AS NEEDED
Status: DISCONTINUED | OUTPATIENT
Start: 2024-10-10 | End: 2024-10-10 | Stop reason: HOSPADM

## 2024-10-10 RX ORDER — DIPHENHYDRAMINE HYDROCHLORIDE 50 MG/ML
50 INJECTION INTRAMUSCULAR; INTRAVENOUS AS NEEDED
OUTPATIENT
Start: 2024-10-10

## 2024-10-10 RX ORDER — EPINEPHRINE 0.3 MG/.3ML
0.3 INJECTION SUBCUTANEOUS EVERY 5 MIN PRN
OUTPATIENT
Start: 2024-10-10

## 2024-10-10 RX ORDER — ALBUTEROL SULFATE 0.83 MG/ML
3 SOLUTION RESPIRATORY (INHALATION) AS NEEDED
OUTPATIENT
Start: 2024-10-10

## 2024-10-10 RX ORDER — EPINEPHRINE 0.3 MG/.3ML
0.3 INJECTION SUBCUTANEOUS EVERY 5 MIN PRN
Status: DISCONTINUED | OUTPATIENT
Start: 2024-10-10 | End: 2024-10-10 | Stop reason: HOSPADM

## 2024-10-10 RX ORDER — FAMOTIDINE 10 MG/ML
20 INJECTION INTRAVENOUS ONCE AS NEEDED
OUTPATIENT
Start: 2024-10-10

## 2024-10-10 RX ORDER — NAPROXEN 500 MG/1
TABLET ORAL
Qty: 60 TABLET | Refills: 2 | Status: SHIPPED | OUTPATIENT
Start: 2024-10-10

## 2024-10-10 SDOH — ECONOMIC STABILITY: INCOME INSECURITY: IN THE LAST 12 MONTHS, WAS THERE A TIME WHEN YOU WERE NOT ABLE TO PAY THE MORTGAGE OR RENT ON TIME?: NO

## 2024-10-10 SDOH — ECONOMIC STABILITY: TRANSPORTATION INSECURITY
IN THE PAST 12 MONTHS, HAS THE LACK OF TRANSPORTATION KEPT YOU FROM MEDICAL APPOINTMENTS OR FROM GETTING MEDICATIONS?: NO

## 2024-10-10 SDOH — ECONOMIC STABILITY: TRANSPORTATION INSECURITY
IN THE PAST 12 MONTHS, HAS LACK OF TRANSPORTATION KEPT YOU FROM MEETINGS, WORK, OR FROM GETTING THINGS NEEDED FOR DAILY LIVING?: NO

## 2024-10-10 ASSESSMENT — PATIENT HEALTH QUESTIONNAIRE - PHQ9
1. LITTLE INTEREST OR PLEASURE IN DOING THINGS: NOT AT ALL
SUM OF ALL RESPONSES TO PHQ9 QUESTIONS 1 & 2: 0
2. FEELING DOWN, DEPRESSED OR HOPELESS: NOT AT ALL

## 2024-10-10 ASSESSMENT — SOCIAL DETERMINANTS OF HEALTH (SDOH): HOW HARD IS IT FOR YOU TO PAY FOR THE VERY BASICS LIKE FOOD, HOUSING, MEDICAL CARE, AND HEATING?: NOT VERY HARD

## 2024-10-10 ASSESSMENT — PAIN SCALES - GENERAL: PAINLEVEL: 6

## 2024-11-15 DIAGNOSIS — E11.9 TYPE 2 DIABETES MELLITUS WITHOUT COMPLICATIONS (MULTI): ICD-10-CM

## 2024-11-15 RX ORDER — METFORMIN HYDROCHLORIDE 1000 MG/1
TABLET ORAL
Qty: 240 TABLET | Refills: 3 | Status: SHIPPED | OUTPATIENT
Start: 2024-11-15

## 2024-11-21 ENCOUNTER — OFFICE VISIT (OUTPATIENT)
Dept: PRIMARY CARE | Facility: CLINIC | Age: 59
End: 2024-11-21
Payer: COMMERCIAL

## 2024-11-21 VITALS
DIASTOLIC BLOOD PRESSURE: 80 MMHG | SYSTOLIC BLOOD PRESSURE: 144 MMHG | WEIGHT: 157.2 LBS | BODY MASS INDEX: 30.7 KG/M2 | HEART RATE: 96 BPM | OXYGEN SATURATION: 97 %

## 2024-11-21 DIAGNOSIS — R10.9 CHRONIC ABDOMINAL PAIN: ICD-10-CM

## 2024-11-21 DIAGNOSIS — G89.29 CHRONIC ABDOMINAL PAIN: ICD-10-CM

## 2024-11-21 DIAGNOSIS — K27.9 PEPTIC ULCER: Primary | ICD-10-CM

## 2024-11-21 DIAGNOSIS — D50.9 IRON DEFICIENCY ANEMIA, UNSPECIFIED IRON DEFICIENCY ANEMIA TYPE: ICD-10-CM

## 2024-11-21 DIAGNOSIS — E11.9 ALTERED METABOLISM DUE TO DIABETES (MULTI): ICD-10-CM

## 2024-11-21 PROCEDURE — 3079F DIAST BP 80-89 MM HG: CPT | Performed by: FAMILY MEDICINE

## 2024-11-21 PROCEDURE — 1036F TOBACCO NON-USER: CPT | Performed by: FAMILY MEDICINE

## 2024-11-21 PROCEDURE — 99214 OFFICE O/P EST MOD 30 MIN: CPT | Performed by: FAMILY MEDICINE

## 2024-11-21 PROCEDURE — 3077F SYST BP >= 140 MM HG: CPT | Performed by: FAMILY MEDICINE

## 2024-11-21 PROCEDURE — 3044F HG A1C LEVEL LT 7.0%: CPT | Performed by: FAMILY MEDICINE

## 2024-11-21 RX ORDER — PEN NEEDLE, DIABETIC 30 GX3/16"
NEEDLE, DISPOSABLE MISCELLANEOUS
Qty: 100 EACH | Refills: 5 | Status: SHIPPED | OUTPATIENT
Start: 2024-11-21

## 2024-11-21 RX ORDER — INSULIN GLARGINE 100 [IU]/ML
INJECTION, SOLUTION SUBCUTANEOUS
Qty: 10.8 ML | Refills: 11 | Status: SHIPPED | OUTPATIENT
Start: 2024-11-21 | End: 2025-11-21

## 2024-11-21 RX ORDER — TRAMADOL HYDROCHLORIDE 50 MG/1
50 TABLET ORAL EVERY 8 HOURS
Qty: 90 TABLET | Refills: 2 | Status: SHIPPED | OUTPATIENT
Start: 2024-11-21 | End: 2025-02-19

## 2024-11-21 RX ORDER — TRAMADOL HYDROCHLORIDE 50 MG/1
50 TABLET ORAL EVERY 6 HOURS PRN
COMMUNITY
End: 2024-11-21 | Stop reason: SDUPTHER

## 2024-11-21 ASSESSMENT — ENCOUNTER SYMPTOMS
OCCASIONAL FEELINGS OF UNSTEADINESS: 0
LOSS OF SENSATION IN FEET: 0
DEPRESSION: 0

## 2024-11-21 NOTE — PROGRESS NOTES
"[facility]: [facility]  E/M EVALUATION    Caty Lal is a 59 y.o. female who presents for Hospital Follow-up (GI issues/[facility]).    Subjective   Pt here for hospital follow up    Was admitted in Oct for UGIB,  presumed NSAID induced, s/p transfusion.  Has EGD scheduled for follow up.  Having some appetite decreased, eating soups etc.  [employer].     DM-last hba1c 6.1.      Anemia- last 9.2  has fu scheduled.       Chronic pain- requests tramadol refill.  Off nsaids completely now due to UGIB.        Review of Systems    Objective   Vitals:    11/21/24 1439   BP: 144/80   Pulse: 96   SpO2: 97%     Physical Exam  Cholesterol   Date Value Ref Range Status   07/25/2023 251 (H) 133 - 200 MG/DL Final     Triglycerides   Date Value Ref Range Status   07/25/2023 234 (H) 40 - 150 MG/DL Final     HDL   Date Value Ref Range Status   07/25/2023 51 >50 MG/DL Final     Comment:     National Cholesterol Education Program(NCFP)guidelines:   <40 mg/dl:Low HDL-cholesterol(major risk factor for CHD)   >60 mg/dl:High HDL-cholesterol(\"negative\"risk factor for CHD)   HDL-cholesterol is affected by a number of factors,e.g.,smoking,  exercise,hormones,sex and age.       LDL Calculated   Date Value Ref Range Status   07/25/2023 153 (H) 65 - 130 MG/DL Final     Cholesterol/HDL Ratio   Date Value Ref Range Status   07/25/2023 4.9 RATIO Final     Comment:     According to the American Heart Association, the goal is to maintain   the total cholesterol/HDL ratio at 5-to-1 or lower with an optimum   ratio of 3.5-to-1.  Performed at [address] [address]       Glucose   Date Value Ref Range Status   07/17/2024 114 (H) 74 - 99 mg/dL Final     Sodium   Date Value Ref Range Status   07/17/2024 139 136 - 145 mmol/L Final     Potassium   Date Value Ref Range Status   07/17/2024 4.6 3.5 - 5.3 mmol/L Final     ALT   Date Value Ref Range Status   07/17/2024 18 7 - 45 U/L Final     " "Comment:     Patients treated with Sulfasalazine may generate falsely decreased results for ALT.     eGFR   Date Value Ref Range Status   07/17/2024 46 (L) >60 mL/min/1.73m*2 Final     Comment:     Calculations of estimated GFR are performed using the 2021 CKD-EPI Study Refit equation without the race variable for the IDMS-Traceable creatinine methods.  https://jasn.asnjournals.org/content/early/2021/09/22/ASN.7880175937     POC HEMOGLOBIN A1c   Date Value Ref Range Status   02/07/2024 7.6 (A) 4.2 - 6.5 % Final     Hemoglobin A1C   Date Value Ref Range Status   10/30/2024 6.1 (H) 4.3 - 5.6 % Final     Comment:     American Diabetes Association guidelines indicate that patients with HgbA1c in the range 5.7-6.4% are at increased risk for development of diabetes, and intervention by lifestyle modification may be beneficial. HgbA1c greater or equal to 6.5% is considered diagnostic of diabetes.   09/25/2024 5.8 (H) See comment % Final     Thyroid Stimulating Hormone   Date Value Ref Range Status   02/09/2018 0.38 0.27 - 4.20 MIU/L Final     Comment:     THIS TSH ASSAY HAS A FUNCTIONAL SENSITIVITY OF 0.01 MIU/L. THIS MEETS   REQUIREMENTS OF A 3RD GENERATION HIGH SENSITIVE TSH ASSAY.  Performed at [place] [id number]         Assessment/Plan      Patient Active Problem List   Diagnosis    Iron deficiency anemia    Peptic ulcer       Diagnoses and all orders for this visit:  Peptic ulcer  -     Referral to Gastroenterology; Future  Chronic abdominal pain  -     traMADol (Ultram) 50 mg tablet; Take 1 tablet (50 mg) by mouth every 8 hours.  Altered metabolism due to diabetes (Multi)  -     insulin glargine (Lantus) 100 unit/mL (3 mL) pen; Inject 20 Units under the skin once daily in the morning AND 16 Units once daily at bedtime. Take as directed per insulin instructions..  -     pen needle, diabetic (Pen Needle) 31 gauge x 3/16\" needle; Use twice per day.  Iron deficiency anemia, unspecified iron " deficiency anemia type  -     Referral to Gastroenterology; Future  Add miralax.     The patient was encouraged to ensure that any/all documentation is accurate and up to date, and that our office be provided a copy in the event that anything changes.         Cj Ji MD

## 2024-12-15 DIAGNOSIS — I10 ESSENTIAL (PRIMARY) HYPERTENSION: ICD-10-CM

## 2024-12-16 RX ORDER — SPIRONOLACTONE AND HYDROCHLOROTHIAZIDE 25; 25 MG/1; MG/1
1 TABLET ORAL DAILY
Qty: 30 TABLET | Refills: 4 | Status: SHIPPED | OUTPATIENT
Start: 2024-12-16

## 2025-01-22 ENCOUNTER — APPOINTMENT (OUTPATIENT)
Dept: HEMATOLOGY/ONCOLOGY | Facility: HOSPITAL | Age: 60
End: 2025-01-22
Payer: COMMERCIAL

## 2025-01-24 ENCOUNTER — OFFICE VISIT (OUTPATIENT)
Dept: PRIMARY CARE | Facility: CLINIC | Age: 60
End: 2025-01-24
Payer: COMMERCIAL

## 2025-01-24 VITALS
BODY MASS INDEX: 30.66 KG/M2 | OXYGEN SATURATION: 97 % | DIASTOLIC BLOOD PRESSURE: 74 MMHG | SYSTOLIC BLOOD PRESSURE: 126 MMHG | HEART RATE: 74 BPM | WEIGHT: 157 LBS

## 2025-01-24 DIAGNOSIS — K27.9 PUD (PEPTIC ULCER DISEASE): ICD-10-CM

## 2025-01-24 DIAGNOSIS — R10.9 CHRONIC ABDOMINAL PAIN: ICD-10-CM

## 2025-01-24 DIAGNOSIS — J01.90 ACUTE BACTERIAL SINUSITIS: Primary | ICD-10-CM

## 2025-01-24 DIAGNOSIS — G89.29 CHRONIC ABDOMINAL PAIN: ICD-10-CM

## 2025-01-24 DIAGNOSIS — B96.89 ACUTE BACTERIAL SINUSITIS: Primary | ICD-10-CM

## 2025-01-24 PROCEDURE — 99214 OFFICE O/P EST MOD 30 MIN: CPT | Performed by: FAMILY MEDICINE

## 2025-01-24 PROCEDURE — 1036F TOBACCO NON-USER: CPT | Performed by: FAMILY MEDICINE

## 2025-01-24 RX ORDER — TRAMADOL HYDROCHLORIDE 50 MG/1
50 TABLET ORAL EVERY 8 HOURS
Qty: 90 TABLET | Refills: 2 | Status: SHIPPED | OUTPATIENT
Start: 2025-02-18 | End: 2025-05-19

## 2025-01-24 RX ORDER — AMOXICILLIN 500 MG/1
500 CAPSULE ORAL EVERY 8 HOURS SCHEDULED
Qty: 30 CAPSULE | Refills: 0 | Status: SHIPPED | OUTPATIENT
Start: 2025-01-24 | End: 2025-02-03

## 2025-01-24 RX ORDER — BENZONATATE 200 MG/1
200 CAPSULE ORAL 3 TIMES DAILY PRN
Qty: 42 CAPSULE | Refills: 0 | Status: SHIPPED | OUTPATIENT
Start: 2025-01-24 | End: 2025-02-23

## 2025-01-24 ASSESSMENT — PAIN SCALES - GENERAL: PAINLEVEL_OUTOF10: 8

## 2025-01-24 ASSESSMENT — ENCOUNTER SYMPTOMS
CHILLS: 1
SHORTNESS OF BREATH: 0
ABDOMINAL PAIN: 1
COUGH: 1
SINUS PRESSURE: 1
SINUS PAIN: 1
RHINORRHEA: 1

## 2025-01-24 NOTE — PROGRESS NOTES
"[facility]: [facility]  E/M EVALUATION    Caty Lal is a 59 y.o. female who presents for Follow-up (Follow up from egd and sinus infection).    Subjective   Pt here for follow up    PUD- Dr Escamilla.  Last EGD she states still had active disease.  Has labs to check anemia through [facility].  Has 3 month follow up.     Sinus pressure-  x 1 week.      Chronic pain  nsaids- contraindicated due to PUD.        Review of Systems   Constitutional:  Positive for chills.   HENT:  Positive for congestion, rhinorrhea, sinus pressure and sinus pain.    Respiratory:  Positive for cough. Negative for shortness of breath.    Gastrointestinal:  Positive for abdominal pain.       Objective   Vitals:    01/24/25 1545   BP: 126/74   Pulse: 74   SpO2: 97%     Physical Exam  Constitutional:       General: She is not in acute distress.  HENT:      Nose:      Right Turbinates: Swollen.      Left Turbinates: Swollen.      Mouth/Throat:      Pharynx: Oropharynx is clear.   Neurological:      Mental Status: She is alert.       Cholesterol   Date Value Ref Range Status   07/25/2023 251 (H) 133 - 200 MG/DL Final     Triglycerides   Date Value Ref Range Status   07/25/2023 234 (H) 40 - 150 MG/DL Final     HDL   Date Value Ref Range Status   07/25/2023 51 >50 MG/DL Final     Comment:     National Cholesterol Education Program(NCFP)guidelines:   <40 mg/dl:Low HDL-cholesterol(major risk factor for CHD)   >60 mg/dl:High HDL-cholesterol(\"negative\"risk factor for CHD)   HDL-cholesterol is affected by a number of factors,e.g.,smoking,  exercise,hormones,sex and age.       LDL Calculated   Date Value Ref Range Status   07/25/2023 153 (H) 65 - 130 MG/DL Final     Cholesterol/HDL Ratio   Date Value Ref Range Status   07/25/2023 4.9 RATIO Final     Comment:     According to the American Heart Association, the goal is to maintain   the total cholesterol/HDL ratio at 5-to-1 or lower with an optimum   ratio of 3.5-to-1.  Performed " at [place] [id number]       Glucose   Date Value Ref Range Status   07/17/2024 114 (H) 74 - 99 mg/dL Final     Sodium   Date Value Ref Range Status   07/17/2024 139 136 - 145 mmol/L Final     Potassium   Date Value Ref Range Status   07/17/2024 4.6 3.5 - 5.3 mmol/L Final     ALT   Date Value Ref Range Status   07/17/2024 18 7 - 45 U/L Final     Comment:     Patients treated with Sulfasalazine may generate falsely decreased results for ALT.     eGFR   Date Value Ref Range Status   07/17/2024 46 (L) >60 mL/min/1.73m*2 Final     Comment:     Calculations of estimated GFR are performed using the 2021 CKD-EPI Study Refit equation without the race variable for the IDMS-Traceable creatinine methods.  https://jasn.asnjournals.org/content/early/2021/09/22/ASN.1094833713     POC HEMOGLOBIN A1c   Date Value Ref Range Status   02/07/2024 7.6 (A) 4.2 - 6.5 % Final     Hemoglobin A1C   Date Value Ref Range Status   10/30/2024 6.1 (H) 4.3 - 5.6 % Final     Comment:     American Diabetes Association guidelines indicate that patients with HgbA1c in the range 5.7-6.4% are at increased risk for development of diabetes, and intervention by lifestyle modification may be beneficial. HgbA1c greater or equal to 6.5% is considered diagnostic of diabetes.   09/25/2024 5.8 (H) See comment % Final     Thyroid Stimulating Hormone   Date Value Ref Range Status   02/09/2018 0.38 0.27 - 4.20 MIU/L Final     Comment:     THIS TSH ASSAY HAS A FUNCTIONAL SENSITIVITY OF 0.01 MIU/L. THIS MEETS   REQUIREMENTS OF A 3RD GENERATION HIGH SENSITIVE TSH ASSAY.  Performed at [place] [id number]         Assessment/Plan      Patient Active Problem List   Diagnosis    Iron deficiency anemia    Peptic ulcer       Diagnoses and all orders for this visit:  Acute bacterial sinusitis  -     amoxicillin (Amoxil) 500 mg capsule; Take 1 capsule (500 mg) by mouth every 8 hours for 10 days.  -     benzonatate  (Tessalon) 200 mg capsule; Take 1 capsule (200 mg) by mouth 3 times a day as needed for cough. Do not crush or chew.  PUD (peptic ulcer disease)  -     Referral to Pain Medicine; Future  Chronic abdominal pain  -     traMADol (Ultram) 50 mg tablet; Take 1 tablet (50 mg) by mouth every 8 hours. Do not fill before February 18, 2025.  -     Referral to Pain Medicine; Future  Pt aware I am leaving this practice and will no longer be providing pain management.      The patient was encouraged to ensure that any/all documentation is accurate and up to date, and that our office be provided a copy in the event that anything changes.         Cj Ji MD

## 2025-02-19 DIAGNOSIS — I10 ESSENTIAL (PRIMARY) HYPERTENSION: ICD-10-CM

## 2025-02-19 RX ORDER — SPIRONOLACTONE AND HYDROCHLOROTHIAZIDE 25; 25 MG/1; MG/1
1 TABLET ORAL DAILY
Qty: 30 TABLET | Refills: 4 | OUTPATIENT
Start: 2025-02-19

## 2025-02-24 DIAGNOSIS — I10 HTN (HYPERTENSION), BENIGN: Primary | ICD-10-CM

## 2025-02-24 RX ORDER — SPIRONOLACTONE AND HYDROCHLOROTHIAZIDE 25; 25 MG/1; MG/1
1 TABLET ORAL DAILY
Qty: 90 TABLET | Refills: 3 | Status: SHIPPED | OUTPATIENT
Start: 2025-02-24 | End: 2026-02-24

## 2025-03-04 ENCOUNTER — LAB (OUTPATIENT)
Dept: LAB | Facility: HOSPITAL | Age: 60
End: 2025-03-04
Payer: COMMERCIAL

## 2025-03-04 DIAGNOSIS — D50.9 IRON DEFICIENCY ANEMIA, UNSPECIFIED: Primary | ICD-10-CM

## 2025-03-04 LAB
ALBUMIN SERPL BCP-MCNC: 4.5 G/DL (ref 3.4–5)
ALP SERPL-CCNC: 58 U/L (ref 33–136)
ALT SERPL W P-5'-P-CCNC: 23 U/L (ref 7–45)
ANION GAP SERPL CALCULATED.3IONS-SCNC: 15 MMOL/L (ref 10–20)
AST SERPL W P-5'-P-CCNC: 19 U/L (ref 9–39)
BASOPHILS # BLD AUTO: 0.05 X10*3/UL (ref 0–0.1)
BASOPHILS NFR BLD AUTO: 0.5 %
BILIRUB SERPL-MCNC: 0.3 MG/DL (ref 0–1.2)
BUN SERPL-MCNC: 25 MG/DL (ref 6–23)
CALCIUM SERPL-MCNC: 10.5 MG/DL (ref 8.6–10.3)
CHLORIDE SERPL-SCNC: 99 MMOL/L (ref 98–107)
CO2 SERPL-SCNC: 28 MMOL/L (ref 21–32)
CREAT SERPL-MCNC: 1.1 MG/DL (ref 0.5–1.05)
EGFRCR SERPLBLD CKD-EPI 2021: 58 ML/MIN/1.73M*2
EOSINOPHIL # BLD AUTO: 0.16 X10*3/UL (ref 0–0.7)
EOSINOPHIL NFR BLD AUTO: 1.5 %
ERYTHROCYTE [DISTWIDTH] IN BLOOD BY AUTOMATED COUNT: 16.7 % (ref 11.5–14.5)
FERRITIN SERPL-MCNC: 43 NG/ML (ref 8–150)
GLUCOSE SERPL-MCNC: 195 MG/DL (ref 74–99)
HCT VFR BLD AUTO: 40.2 % (ref 36–46)
HGB BLD-MCNC: 12.6 G/DL (ref 12–16)
IMM GRANULOCYTES # BLD AUTO: 0.11 X10*3/UL (ref 0–0.7)
IMM GRANULOCYTES NFR BLD AUTO: 1 % (ref 0–0.9)
IRON SATN MFR SERPL: 9 % (ref 25–45)
IRON SERPL-MCNC: 42 UG/DL (ref 35–150)
LYMPHOCYTES # BLD AUTO: 4.72 X10*3/UL (ref 1.2–4.8)
LYMPHOCYTES NFR BLD AUTO: 44.6 %
MCH RBC QN AUTO: 26.6 PG (ref 26–34)
MCHC RBC AUTO-ENTMCNC: 31.3 G/DL (ref 32–36)
MCV RBC AUTO: 85 FL (ref 80–100)
MONOCYTES # BLD AUTO: 0.63 X10*3/UL (ref 0.1–1)
MONOCYTES NFR BLD AUTO: 5.9 %
NEUTROPHILS # BLD AUTO: 4.92 X10*3/UL (ref 1.2–7.7)
NEUTROPHILS NFR BLD AUTO: 46.5 %
NRBC BLD-RTO: 0 /100 WBCS (ref 0–0)
PLATELET # BLD AUTO: 411 X10*3/UL (ref 150–450)
POTASSIUM SERPL-SCNC: 4.7 MMOL/L (ref 3.5–5.3)
PROT SERPL-MCNC: 7 G/DL (ref 6.4–8.2)
RBC # BLD AUTO: 4.74 X10*6/UL (ref 4–5.2)
SODIUM SERPL-SCNC: 137 MMOL/L (ref 136–145)
TIBC SERPL-MCNC: 454 UG/DL (ref 240–445)
UIBC SERPL-MCNC: 412 UG/DL (ref 110–370)
WBC # BLD AUTO: 10.6 X10*3/UL (ref 4.4–11.3)

## 2025-03-04 PROCEDURE — 85025 COMPLETE CBC W/AUTO DIFF WBC: CPT

## 2025-03-04 PROCEDURE — 83540 ASSAY OF IRON: CPT

## 2025-03-04 PROCEDURE — 82728 ASSAY OF FERRITIN: CPT

## 2025-03-04 PROCEDURE — 80053 COMPREHEN METABOLIC PANEL: CPT

## 2025-03-04 PROCEDURE — 83550 IRON BINDING TEST: CPT

## 2025-03-05 ENCOUNTER — OFFICE VISIT (OUTPATIENT)
Dept: HEMATOLOGY/ONCOLOGY | Facility: HOSPITAL | Age: 60
End: 2025-03-05
Payer: COMMERCIAL

## 2025-03-05 VITALS
OXYGEN SATURATION: 94 % | HEART RATE: 93 BPM | HEIGHT: 59 IN | BODY MASS INDEX: 32.27 KG/M2 | TEMPERATURE: 96.4 F | SYSTOLIC BLOOD PRESSURE: 166 MMHG | WEIGHT: 160.05 LBS | RESPIRATION RATE: 16 BRPM | DIASTOLIC BLOOD PRESSURE: 98 MMHG

## 2025-03-05 DIAGNOSIS — D50.9 IRON DEFICIENCY ANEMIA, UNSPECIFIED IRON DEFICIENCY ANEMIA TYPE: Primary | ICD-10-CM

## 2025-03-05 PROCEDURE — 99215 OFFICE O/P EST HI 40 MIN: CPT | Performed by: INTERNAL MEDICINE

## 2025-03-05 PROCEDURE — 3008F BODY MASS INDEX DOCD: CPT | Performed by: INTERNAL MEDICINE

## 2025-03-05 RX ORDER — ALBUTEROL SULFATE 0.83 MG/ML
3 SOLUTION RESPIRATORY (INHALATION) AS NEEDED
OUTPATIENT
Start: 2025-03-18

## 2025-03-05 RX ORDER — EPINEPHRINE 0.3 MG/.3ML
0.3 INJECTION SUBCUTANEOUS EVERY 5 MIN PRN
OUTPATIENT
Start: 2025-03-18

## 2025-03-05 RX ORDER — DIPHENHYDRAMINE HYDROCHLORIDE 50 MG/ML
50 INJECTION INTRAMUSCULAR; INTRAVENOUS AS NEEDED
OUTPATIENT
Start: 2025-03-18

## 2025-03-05 RX ORDER — FAMOTIDINE 10 MG/ML
20 INJECTION, SOLUTION INTRAVENOUS ONCE AS NEEDED
OUTPATIENT
Start: 2025-03-18

## 2025-03-05 ASSESSMENT — ENCOUNTER SYMPTOMS
MYALGIAS: 1
FATIGUE: 1
ABDOMINAL PAIN: 1
FEVER: 0
UNEXPECTED WEIGHT CHANGE: 0
RESPIRATORY NEGATIVE: 1
CARDIOVASCULAR NEGATIVE: 1

## 2025-03-05 ASSESSMENT — PAIN SCALES - GENERAL: PAINLEVEL_OUTOF10: 6

## 2025-03-05 NOTE — PROGRESS NOTES
Patient ID: Caty Lal is a 59 y.o. female.    Subjective:  Returns for follow up for iron deficiency anemia. My initial note on 7/17/24 is as follows:  Referred for iron deficiency anemia. Has had iron deficiency since mid 2023. Has had EGD and colonoscopy in Dec 2023 that were both neg for mass or bleeding. Pica++. Hair loss and brittle nails. Says she occaionally has tarry black stools. Has had kidney stones before and intermittently has hematuria. Has been on PO iron since Feb 2024.   Occasional LUQ abd pain.     Today, she says she feels tired again. Was admitted to hospital in late Oct 2024 with abdominal pain. EGD showed multiple gastric ulcers that were thought to be due to NSAIDs. Stopped those and has been on PPIs. Repeat EGD in Jan 2025 was OK.     Assessment/Plan:  ? Iron deficiency anemia: Hb was around 9. Iron sat and ferritin were both low. Had fatigue, pica, and hair loss. po iron has been ineffective and caused significant GI side effects before. Already had GI work-up which were neg in Oct 2023. CT abd/pel in Mar 2023 were neg except kidney stones. Has FH of colon cancer. CEA and US Abd in Aug 2024 were OK.   S/p Venofer 300 mg x 3 in August 2024. And then one more dose in Sep 2024 =>   Developed GI bleeding in Oct 2024 => EGD dhosed multiple gastric ulcers. Repeat EGD in Jan 2025 was OK.     It seems GI bleeding was due to NSAID induced gastritis. She now stopped those. Her current blood work reveals low iron => Will give 4 doses of Venofer 300 mg. Will repeat labs in 6 months.     Review Of Systems:  Review of Systems   Constitutional:  Positive for fatigue. Negative for fever and unexpected weight change.   HENT:  Negative.     Respiratory: Negative.     Cardiovascular: Negative.    Gastrointestinal:  Positive for abdominal pain.   Genitourinary: Negative.     Musculoskeletal:  Positive for myalgias.       Physical Exam:  BP (!) 166/98 (BP Location: Right arm, Patient Position:  "Sitting, BP Cuff Size: Adult) Comment: advised to follow up with PCP  Pulse 93   Temp 35.8 °C (96.4 °F) (Temporal)   Resp 16   Ht 1.499 m (4' 11\")   Wt 72.6 kg (160 lb 0.9 oz)   SpO2 94%   BMI 32.33 kg/m²   BSA: 1.74 meters squared  Performance Status: Asymptomatic  Physical Exam  HENT:      Head: Normocephalic and atraumatic.   Eyes:      General: No scleral icterus.  Cardiovascular:      Rate and Rhythm: Normal rate and regular rhythm.   Pulmonary:      Effort: Pulmonary effort is normal.   Abdominal:      General: Abdomen is flat.      Palpations: Abdomen is soft.   Musculoskeletal:         General: Normal range of motion.   Skin:     Coloration: Skin is not jaundiced.   Neurological:      General: No focal deficit present.      Mental Status: She is alert and oriented to person, place, and time.         Results:  Diagnostic Results   Lab Results   Component Value Date    WBC 10.6 03/04/2025    HGB 12.6 03/04/2025    HCT 40.2 03/04/2025    MCV 85 03/04/2025     03/04/2025     Lab Results   Component Value Date    CALCIUM 10.5 (H) 03/04/2025     03/04/2025    K 4.7 03/04/2025    CO2 28 03/04/2025    CL 99 03/04/2025    BUN 25 (H) 03/04/2025    CREATININE 1.10 (H) 03/04/2025    ALT 23 03/04/2025    AST 19 03/04/2025       Current Outpatient Medications:     amLODIPine (Norvasc) 10 mg tablet, TAKE 1 TABLET BY MOUTH EVERY DAY, Disp: 30 tablet, Rfl: 11    blood sugar diagnostic (True Metrix Glucose Test Strip) strip, 1 strip once daily., Disp: 100 strip, Rfl: 3    FreeStyle Lite Strips strip, test AS DIRECTED TWICE DAILY, Disp: 100 strip, Rfl: 11    insulin glargine (Lantus) 100 unit/mL (3 mL) pen, Inject 20 Units under the skin once daily in the morning AND 16 Units once daily at bedtime. Take as directed per insulin instructions.., Disp: 10.8 mL, Rfl: 11    metFORMIN (Glucophage) 1,000 mg tablet, TAKE 1 TABLET BY MOUTH TWICE DAILY with a meal, Disp: 240 tablet, Rfl: 3    omeprazole (PriLOSEC) 40 " "mg DR capsule, Take 1 capsule (40 mg) by mouth 2 times a day., Disp: 180 capsule, Rfl: 3    ondansetron ODT (Zofran-ODT) 8 mg disintegrating tablet, Dissolve 1 tablet (8 mg) in the mouth every 8 hours if needed for nausea or vomiting., Disp: , Rfl:     pen needle, diabetic (Pen Needle) 31 gauge x 3/16\" needle, Use twice per day., Disp: 100 each, Rfl: 5    spironolacton-hydrochlorothiaz (Aldactazide) 25-25 mg tablet, Take 1 tablet (25 mg) by mouth once daily., Disp: 90 tablet, Rfl: 3    traMADol (Ultram) 50 mg tablet, Take 1 tablet (50 mg) by mouth every 8 hours. Do not fill before February 18, 2025., Disp: 90 tablet, Rfl: 2    Unilet Lancet 33 gauge misc, use to test blood sugar once daily, Disp: 100 each, Rfl: 0     No past surgical history on file.  No family history on file.   reports that she has never smoked. She has never been exposed to tobacco smoke. She has never used smokeless tobacco.  Social History     Socioeconomic History    Marital status:      Spouse name: Not on file    Number of children: Not on file    Years of education: Not on file    Highest education level: Not on file   Occupational History    Not on file   Tobacco Use    Smoking status: Never     Passive exposure: Never    Smokeless tobacco: Never   Substance and Sexual Activity    Alcohol use: Never    Drug use: Never    Sexual activity: Not on file   Other Topics Concern    Not on file   Social History Narrative    Not on file     Social Drivers of Health     Financial Resource Strain: Low Risk  (10/10/2024)    Overall Financial Resource Strain (CARDIA)     Difficulty of Paying Living Expenses: Not very hard   Food Insecurity: No Food Insecurity (10/29/2024)    Received from [facility]    Hunger Vital Sign     Worried About Running Out of Food in the Last Year: Never true     Ran Out of Food in the Last Year: Never true   Transportation Needs: No Transportation Needs (10/29/2024)    Received from [facility]    MALACHI - " Transportation     Lack of Transportation (Medical): No     Lack of Transportation (Non-Medical): No   Physical Activity: Not on file   Stress: No Stress Concern Present (10/10/2024)    [employer] [place] [employer] - Occupational Stress Questionnaire     Feeling of Stress : Not at all   Social Connections: Not on file   Intimate Partner Violence: Not on file   Housing Stability: Unknown (10/29/2024)    Received from [facility]    Housing Stability Vital Sign     Unable to Pay for Housing in the Last Year: No     Number of Times Moved in the Last Year: Not on file     Homeless in the Last Year: No       Diagnoses and all orders for this visit:  Iron deficiency anemia, unspecified iron deficiency anemia type  Other orders  -     iron sucrose (Venofer) 300 mg in sodium chloride 0.9% 250 mL IV  -     sodium chloride 0.9 % bolus 500 mL  -     dextrose 5 % in water (D5W) bolus 500 mL  -     diphenhydrAMINE (BENADryl) injection 50 mg  -     methylPREDNISolone sod succinate (SOLU-Medrol) 40 mg/mL injection 40 mg  -     famotidine PF (Pepcid) injection 20 mg  -     EPINEPHrine (Epipen) injection syringe 0.3 mg  -     albuterol 2.5 mg /3 mL (0.083 %) nebulizer solution 3 mL       I spent more than 30 minutes for the patient today, including face-to-face conversation, pre-visit preparation, post-visit orders, and others.   Bella Jacobo MD

## 2025-03-12 ENCOUNTER — INFUSION (OUTPATIENT)
Dept: HEMATOLOGY/ONCOLOGY | Facility: HOSPITAL | Age: 60
End: 2025-03-12
Payer: COMMERCIAL

## 2025-03-12 VITALS
RESPIRATION RATE: 16 BRPM | HEART RATE: 88 BPM | OXYGEN SATURATION: 95 % | DIASTOLIC BLOOD PRESSURE: 54 MMHG | TEMPERATURE: 96.8 F | SYSTOLIC BLOOD PRESSURE: 135 MMHG

## 2025-03-12 DIAGNOSIS — D50.9 IRON DEFICIENCY ANEMIA, UNSPECIFIED IRON DEFICIENCY ANEMIA TYPE: ICD-10-CM

## 2025-03-12 PROCEDURE — 2500000004 HC RX 250 GENERAL PHARMACY W/ HCPCS (ALT 636 FOR OP/ED): Mod: SE | Performed by: INTERNAL MEDICINE

## 2025-03-12 PROCEDURE — 96365 THER/PROPH/DIAG IV INF INIT: CPT | Mod: INF

## 2025-03-12 RX ORDER — DIPHENHYDRAMINE HYDROCHLORIDE 50 MG/ML
50 INJECTION INTRAMUSCULAR; INTRAVENOUS AS NEEDED
Status: DISCONTINUED | OUTPATIENT
Start: 2025-03-12 | End: 2025-03-12 | Stop reason: HOSPADM

## 2025-03-12 RX ORDER — FAMOTIDINE 10 MG/ML
20 INJECTION, SOLUTION INTRAVENOUS ONCE AS NEEDED
OUTPATIENT
Start: 2025-03-16

## 2025-03-12 RX ORDER — EPINEPHRINE 0.3 MG/.3ML
0.3 INJECTION SUBCUTANEOUS EVERY 5 MIN PRN
OUTPATIENT
Start: 2025-03-16

## 2025-03-12 RX ORDER — FAMOTIDINE 10 MG/ML
20 INJECTION, SOLUTION INTRAVENOUS ONCE AS NEEDED
Status: DISCONTINUED | OUTPATIENT
Start: 2025-03-12 | End: 2025-03-12 | Stop reason: HOSPADM

## 2025-03-12 RX ORDER — EPINEPHRINE 0.3 MG/.3ML
0.3 INJECTION SUBCUTANEOUS EVERY 5 MIN PRN
Status: DISCONTINUED | OUTPATIENT
Start: 2025-03-12 | End: 2025-03-12 | Stop reason: HOSPADM

## 2025-03-12 RX ORDER — ALBUTEROL SULFATE 0.83 MG/ML
3 SOLUTION RESPIRATORY (INHALATION) AS NEEDED
Status: DISCONTINUED | OUTPATIENT
Start: 2025-03-12 | End: 2025-03-12 | Stop reason: HOSPADM

## 2025-03-12 RX ORDER — DIPHENHYDRAMINE HYDROCHLORIDE 50 MG/ML
50 INJECTION INTRAMUSCULAR; INTRAVENOUS AS NEEDED
OUTPATIENT
Start: 2025-03-16

## 2025-03-12 RX ORDER — ALBUTEROL SULFATE 0.83 MG/ML
3 SOLUTION RESPIRATORY (INHALATION) AS NEEDED
OUTPATIENT
Start: 2025-03-16

## 2025-03-12 RX ADMIN — IRON SUCROSE 300 MG: 20 INJECTION, SOLUTION INTRAVENOUS at 13:48

## 2025-03-12 ASSESSMENT — COLUMBIA-SUICIDE SEVERITY RATING SCALE - C-SSRS
2. HAVE YOU ACTUALLY HAD ANY THOUGHTS OF KILLING YOURSELF?: NO
1. IN THE PAST MONTH, HAVE YOU WISHED YOU WERE DEAD OR WISHED YOU COULD GO TO SLEEP AND NOT WAKE UP?: NO
6. HAVE YOU EVER DONE ANYTHING, STARTED TO DO ANYTHING, OR PREPARED TO DO ANYTHING TO END YOUR LIFE?: NO

## 2025-03-12 ASSESSMENT — PATIENT HEALTH QUESTIONNAIRE - PHQ9
2. FEELING DOWN, DEPRESSED OR HOPELESS: NOT AT ALL
1. LITTLE INTEREST OR PLEASURE IN DOING THINGS: NOT AT ALL
SUM OF ALL RESPONSES TO PHQ9 QUESTIONS 1 & 2: 0

## 2025-03-12 ASSESSMENT — ENCOUNTER SYMPTOMS
LOSS OF SENSATION IN FEET: 1
DEPRESSION: 0
OCCASIONAL FEELINGS OF UNSTEADINESS: 1

## 2025-03-12 ASSESSMENT — PAIN SCALES - GENERAL: PAINLEVEL_OUTOF10: 6

## 2025-03-17 ENCOUNTER — APPOINTMENT (OUTPATIENT)
Dept: HEMATOLOGY/ONCOLOGY | Facility: HOSPITAL | Age: 60
End: 2025-03-17
Payer: COMMERCIAL

## 2025-03-19 DIAGNOSIS — I10 ESSENTIAL (PRIMARY) HYPERTENSION: ICD-10-CM

## 2025-03-19 DIAGNOSIS — K25.9 GASTRIC ULCER, UNSPECIFIED AS ACUTE OR CHRONIC, WITHOUT HEMORRHAGE OR PERFORATION: ICD-10-CM

## 2025-03-19 RX ORDER — AMLODIPINE BESYLATE 10 MG/1
10 TABLET ORAL DAILY
Qty: 90 TABLET | Refills: 0 | Status: SHIPPED | OUTPATIENT
Start: 2025-03-19 | End: 2025-06-17

## 2025-03-19 RX ORDER — OMEPRAZOLE 40 MG/1
40 CAPSULE, DELAYED RELEASE ORAL 2 TIMES DAILY
Qty: 180 CAPSULE | Refills: 0 | Status: SHIPPED | OUTPATIENT
Start: 2025-03-19 | End: 2026-03-19

## 2025-03-21 ENCOUNTER — INFUSION (OUTPATIENT)
Dept: HEMATOLOGY/ONCOLOGY | Facility: HOSPITAL | Age: 60
End: 2025-03-21
Payer: COMMERCIAL

## 2025-03-21 VITALS
RESPIRATION RATE: 16 BRPM | DIASTOLIC BLOOD PRESSURE: 80 MMHG | TEMPERATURE: 96.4 F | HEART RATE: 84 BPM | SYSTOLIC BLOOD PRESSURE: 147 MMHG | OXYGEN SATURATION: 97 %

## 2025-03-21 DIAGNOSIS — D50.9 IRON DEFICIENCY ANEMIA, UNSPECIFIED IRON DEFICIENCY ANEMIA TYPE: ICD-10-CM

## 2025-03-21 PROCEDURE — 2500000004 HC RX 250 GENERAL PHARMACY W/ HCPCS (ALT 636 FOR OP/ED): Mod: SE | Performed by: INTERNAL MEDICINE

## 2025-03-21 PROCEDURE — 96365 THER/PROPH/DIAG IV INF INIT: CPT | Mod: INF

## 2025-03-21 RX ORDER — FAMOTIDINE 10 MG/ML
20 INJECTION, SOLUTION INTRAVENOUS ONCE AS NEEDED
OUTPATIENT
Start: 2025-03-24

## 2025-03-21 RX ORDER — HEPARIN SODIUM,PORCINE/PF 10 UNIT/ML
50 SYRINGE (ML) INTRAVENOUS AS NEEDED
OUTPATIENT
Start: 2025-03-21

## 2025-03-21 RX ORDER — ALBUTEROL SULFATE 0.83 MG/ML
3 SOLUTION RESPIRATORY (INHALATION) AS NEEDED
OUTPATIENT
Start: 2025-03-24

## 2025-03-21 RX ORDER — HEPARIN 100 UNIT/ML
500 SYRINGE INTRAVENOUS AS NEEDED
OUTPATIENT
Start: 2025-03-21

## 2025-03-21 RX ORDER — EPINEPHRINE 0.3 MG/.3ML
0.3 INJECTION SUBCUTANEOUS EVERY 5 MIN PRN
OUTPATIENT
Start: 2025-03-24

## 2025-03-21 RX ORDER — DIPHENHYDRAMINE HYDROCHLORIDE 50 MG/ML
50 INJECTION, SOLUTION INTRAMUSCULAR; INTRAVENOUS AS NEEDED
OUTPATIENT
Start: 2025-03-24

## 2025-03-21 RX ADMIN — IRON SUCROSE 300 MG: 20 INJECTION, SOLUTION INTRAVENOUS at 13:39

## 2025-03-21 ASSESSMENT — PATIENT HEALTH QUESTIONNAIRE - PHQ9
2. FEELING DOWN, DEPRESSED OR HOPELESS: NOT AT ALL
SUM OF ALL RESPONSES TO PHQ9 QUESTIONS 1 & 2: 0
1. LITTLE INTEREST OR PLEASURE IN DOING THINGS: NOT AT ALL

## 2025-03-21 ASSESSMENT — PAIN SCALES - GENERAL: PAINLEVEL_OUTOF10: 8

## 2025-03-25 ENCOUNTER — INFUSION (OUTPATIENT)
Dept: HEMATOLOGY/ONCOLOGY | Facility: HOSPITAL | Age: 60
End: 2025-03-25
Payer: COMMERCIAL

## 2025-03-25 VITALS
DIASTOLIC BLOOD PRESSURE: 80 MMHG | RESPIRATION RATE: 16 BRPM | SYSTOLIC BLOOD PRESSURE: 135 MMHG | HEART RATE: 88 BPM | OXYGEN SATURATION: 96 % | TEMPERATURE: 96.4 F

## 2025-03-25 DIAGNOSIS — D50.9 IRON DEFICIENCY ANEMIA, UNSPECIFIED IRON DEFICIENCY ANEMIA TYPE: ICD-10-CM

## 2025-03-25 PROCEDURE — 2500000004 HC RX 250 GENERAL PHARMACY W/ HCPCS (ALT 636 FOR OP/ED): Mod: SE | Performed by: INTERNAL MEDICINE

## 2025-03-25 PROCEDURE — 96365 THER/PROPH/DIAG IV INF INIT: CPT | Mod: INF

## 2025-03-25 RX ORDER — FAMOTIDINE 10 MG/ML
20 INJECTION, SOLUTION INTRAVENOUS ONCE AS NEEDED
OUTPATIENT
Start: 2025-03-29

## 2025-03-25 RX ORDER — DIPHENHYDRAMINE HYDROCHLORIDE 50 MG/ML
50 INJECTION, SOLUTION INTRAMUSCULAR; INTRAVENOUS AS NEEDED
Status: DISCONTINUED | OUTPATIENT
Start: 2025-03-25 | End: 2025-03-25 | Stop reason: HOSPADM

## 2025-03-25 RX ORDER — EPINEPHRINE 0.3 MG/.3ML
0.3 INJECTION SUBCUTANEOUS EVERY 5 MIN PRN
Status: DISCONTINUED | OUTPATIENT
Start: 2025-03-25 | End: 2025-03-25 | Stop reason: HOSPADM

## 2025-03-25 RX ORDER — FAMOTIDINE 10 MG/ML
20 INJECTION, SOLUTION INTRAVENOUS ONCE AS NEEDED
Status: DISCONTINUED | OUTPATIENT
Start: 2025-03-25 | End: 2025-03-25 | Stop reason: HOSPADM

## 2025-03-25 RX ORDER — ALBUTEROL SULFATE 0.83 MG/ML
3 SOLUTION RESPIRATORY (INHALATION) AS NEEDED
OUTPATIENT
Start: 2025-03-29

## 2025-03-25 RX ORDER — EPINEPHRINE 0.3 MG/.3ML
0.3 INJECTION SUBCUTANEOUS EVERY 5 MIN PRN
OUTPATIENT
Start: 2025-03-29

## 2025-03-25 RX ORDER — ALBUTEROL SULFATE 0.83 MG/ML
3 SOLUTION RESPIRATORY (INHALATION) AS NEEDED
Status: DISCONTINUED | OUTPATIENT
Start: 2025-03-25 | End: 2025-03-25 | Stop reason: HOSPADM

## 2025-03-25 RX ORDER — DIPHENHYDRAMINE HYDROCHLORIDE 50 MG/ML
50 INJECTION, SOLUTION INTRAMUSCULAR; INTRAVENOUS AS NEEDED
OUTPATIENT
Start: 2025-03-29

## 2025-03-25 RX ADMIN — IRON SUCROSE 300 MG: 20 INJECTION, SOLUTION INTRAVENOUS at 13:33

## 2025-03-25 ASSESSMENT — PAIN SCALES - GENERAL: PAINLEVEL_OUTOF10: 7

## 2025-03-27 DIAGNOSIS — I10 ESSENTIAL (PRIMARY) HYPERTENSION: ICD-10-CM

## 2025-03-27 DIAGNOSIS — K25.9 GASTRIC ULCER, UNSPECIFIED AS ACUTE OR CHRONIC, WITHOUT HEMORRHAGE OR PERFORATION: ICD-10-CM

## 2025-03-27 RX ORDER — OMEPRAZOLE 40 MG/1
40 CAPSULE, DELAYED RELEASE ORAL 2 TIMES DAILY
Qty: 180 CAPSULE | Refills: 0 | Status: SHIPPED | OUTPATIENT
Start: 2025-03-27 | End: 2026-03-27

## 2025-03-27 RX ORDER — AMLODIPINE BESYLATE 10 MG/1
10 TABLET ORAL DAILY
Qty: 90 TABLET | Refills: 0 | Status: SHIPPED | OUTPATIENT
Start: 2025-03-27 | End: 2025-06-25

## 2025-03-27 NOTE — TELEPHONE ENCOUNTER
Prescriptions sent by Dr. Aguilar on 03/19/25 but pharmacy stated they did not receive them.  Please resend.  Thank you.

## 2025-03-31 ENCOUNTER — TELEPHONE (OUTPATIENT)
Dept: HEMATOLOGY/ONCOLOGY | Facility: HOSPITAL | Age: 60
End: 2025-03-31
Payer: COMMERCIAL

## 2025-03-31 ENCOUNTER — APPOINTMENT (OUTPATIENT)
Dept: HEMATOLOGY/ONCOLOGY | Facility: HOSPITAL | Age: 60
End: 2025-03-31
Payer: COMMERCIAL

## 2025-03-31 NOTE — TELEPHONE ENCOUNTER
Caty Lal would like to cancel the following appointments:     INF 01 GENEVA in [facility] ([id number]), 3/31/2025  1:30 PM     Comments:  I need to cancel this appointment for my infusion and need to reschedule it for asap thank you very much

## 2025-03-31 NOTE — TELEPHONE ENCOUNTER
Patient scheduled for next dose of Venofer today, 3/31/25 at 1:30 PM. Received attached MyChart cancellation request. Reached out to patient to reschedule. Patient has a work conflict and is agreeable to reschedule. Rescheduled to Thurs, 4/3/25 at 1:30 PM. Patient verbalized understanding and agreement regarding discussed information via verbal feedback.

## 2025-04-02 ENCOUNTER — OFFICE VISIT (OUTPATIENT)
Dept: PRIMARY CARE | Facility: CLINIC | Age: 60
End: 2025-04-02
Payer: COMMERCIAL

## 2025-04-02 VITALS
OXYGEN SATURATION: 98 % | HEIGHT: 59 IN | BODY MASS INDEX: 31.85 KG/M2 | HEART RATE: 100 BPM | DIASTOLIC BLOOD PRESSURE: 78 MMHG | SYSTOLIC BLOOD PRESSURE: 160 MMHG | WEIGHT: 158 LBS

## 2025-04-02 DIAGNOSIS — E66.811 CLASS 1 OBESITY DUE TO EXCESS CALORIES WITHOUT SERIOUS COMORBIDITY WITH BODY MASS INDEX (BMI) OF 31.0 TO 31.9 IN ADULT: ICD-10-CM

## 2025-04-02 DIAGNOSIS — I10 HTN (HYPERTENSION), BENIGN: ICD-10-CM

## 2025-04-02 DIAGNOSIS — E11.9 ALTERED METABOLISM DUE TO DIABETES (MULTI): ICD-10-CM

## 2025-04-02 DIAGNOSIS — Z13.6 ENCOUNTER FOR SCREENING FOR CARDIOVASCULAR DISORDERS: ICD-10-CM

## 2025-04-02 DIAGNOSIS — E11.9 TYPE 2 DIABETES MELLITUS WITHOUT COMPLICATIONS: ICD-10-CM

## 2025-04-02 DIAGNOSIS — I10 ESSENTIAL (PRIMARY) HYPERTENSION: ICD-10-CM

## 2025-04-02 DIAGNOSIS — Z13.1 SCREENING FOR DIABETES MELLITUS: ICD-10-CM

## 2025-04-02 DIAGNOSIS — D50.0 IRON DEFICIENCY ANEMIA DUE TO CHRONIC BLOOD LOSS: ICD-10-CM

## 2025-04-02 DIAGNOSIS — E55.9 VITAMIN D DEFICIENCY: ICD-10-CM

## 2025-04-02 DIAGNOSIS — R79.9 ABNORMAL FINDING OF BLOOD CHEMISTRY, UNSPECIFIED: ICD-10-CM

## 2025-04-02 DIAGNOSIS — Z12.31 SCREENING MAMMOGRAM FOR BREAST CANCER: ICD-10-CM

## 2025-04-02 DIAGNOSIS — Z13.29 SCREENING FOR THYROID DISORDER: ICD-10-CM

## 2025-04-02 DIAGNOSIS — N18.32 STAGE 3B CHRONIC KIDNEY DISEASE (MULTI): ICD-10-CM

## 2025-04-02 DIAGNOSIS — Z00.00 WELLNESS EXAMINATION: Primary | ICD-10-CM

## 2025-04-02 DIAGNOSIS — Z11.59 SCREENING FOR VIRAL DISEASE: ICD-10-CM

## 2025-04-02 DIAGNOSIS — E66.09 CLASS 1 OBESITY DUE TO EXCESS CALORIES WITHOUT SERIOUS COMORBIDITY WITH BODY MASS INDEX (BMI) OF 31.0 TO 31.9 IN ADULT: ICD-10-CM

## 2025-04-02 PROBLEM — E83.52 HYPERCALCEMIA: Status: ACTIVE | Noted: 2025-04-02

## 2025-04-02 PROBLEM — D72.820 LYMPHOCYTOSIS: Status: ACTIVE | Noted: 2025-04-02

## 2025-04-02 PROBLEM — N39.0 ACUTE URINARY TRACT INFECTION: Status: ACTIVE | Noted: 2023-03-22

## 2025-04-02 PROBLEM — J10.1 INFLUENZA DUE TO INFLUENZA A VIRUS: Status: ACTIVE | Noted: 2025-04-02

## 2025-04-02 PROBLEM — G89.29 CHRONIC LOW BACK PAIN: Status: ACTIVE | Noted: 2024-10-29

## 2025-04-02 PROBLEM — M99.03 SOMATIC DYSFUNCTION OF LUMBAR REGION: Status: ACTIVE | Noted: 2024-10-31

## 2025-04-02 PROBLEM — J02.9 SORE THROAT: Status: ACTIVE | Noted: 2025-04-02
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
## 2025-04-02 PROBLEM — K31.84 GASTROPARESIS: Status: ACTIVE | Noted: 2025-04-02

## 2025-04-02 PROBLEM — F43.0 STRESS REACTION: Status: ACTIVE | Noted: 2025-04-02

## 2025-04-02 PROBLEM — M25.519 SHOULDER PAIN: Status: ACTIVE | Noted: 2025-04-02

## 2025-04-02 PROBLEM — R05.9 COUGH: Status: ACTIVE | Noted: 2025-04-02

## 2025-04-02 PROBLEM — N20.0 CALCULUS OF KIDNEY: Status: ACTIVE | Noted: 2023-03-22

## 2025-04-02 PROBLEM — F41.1 GENERALIZED ANXIETY DISORDER: Status: ACTIVE | Noted: 2025-04-02

## 2025-04-02 PROBLEM — E16.2 HYPOGLYCEMIA: Status: ACTIVE | Noted: 2025-04-02

## 2025-04-02 PROBLEM — K92.2 GI (GASTROINTESTINAL BLEED): Status: ACTIVE | Noted: 2024-10-29

## 2025-04-02 PROBLEM — D64.9 ANEMIA: Status: ACTIVE | Noted: 2025-04-02

## 2025-04-02 PROBLEM — E87.20 LACTIC ACIDOSIS: Status: ACTIVE | Noted: 2025-04-02

## 2025-04-02 PROBLEM — B37.81 CANDIDIASIS OF ESOPHAGUS (MULTI): Status: ACTIVE | Noted: 2025-04-02

## 2025-04-02 PROBLEM — M99.04 SOMATIC DYSFUNCTION OF SACRAL REGION: Status: ACTIVE | Noted: 2024-10-31

## 2025-04-02 PROBLEM — M54.50 CHRONIC LOW BACK PAIN: Status: ACTIVE | Noted: 2024-10-29

## 2025-04-02 PROBLEM — R06.00 DYSPNEA: Status: ACTIVE | Noted: 2025-04-02

## 2025-04-02 PROBLEM — S16.1XXA STRAIN OF NECK MUSCLE: Status: ACTIVE | Noted: 2025-04-02

## 2025-04-02 PROBLEM — M19.90 IDIOPATHIC OSTEOARTHRITIS: Status: ACTIVE | Noted: 2025-04-02

## 2025-04-02 PROCEDURE — 3078F DIAST BP <80 MM HG: CPT | Performed by: FAMILY MEDICINE

## 2025-04-02 PROCEDURE — 99386 PREV VISIT NEW AGE 40-64: CPT | Performed by: FAMILY MEDICINE

## 2025-04-02 PROCEDURE — 3008F BODY MASS INDEX DOCD: CPT | Performed by: FAMILY MEDICINE

## 2025-04-02 PROCEDURE — 1036F TOBACCO NON-USER: CPT | Performed by: FAMILY MEDICINE

## 2025-04-02 PROCEDURE — 3077F SYST BP >= 140 MM HG: CPT | Performed by: FAMILY MEDICINE

## 2025-04-02 PROCEDURE — 99214 OFFICE O/P EST MOD 30 MIN: CPT | Mod: 25 | Performed by: FAMILY MEDICINE

## 2025-04-02 PROCEDURE — 4010F ACE/ARB THERAPY RXD/TAKEN: CPT | Performed by: FAMILY MEDICINE

## 2025-04-02 RX ORDER — INSULIN GLARGINE 100 [IU]/ML
20 INJECTION, SOLUTION SUBCUTANEOUS EVERY MORNING
Qty: 18 ML | Refills: 0 | Status: SHIPPED | OUTPATIENT
Start: 2025-04-02 | End: 2025-07-01

## 2025-04-02 RX ORDER — LOSARTAN POTASSIUM 50 MG/1
50 TABLET ORAL DAILY
Qty: 90 TABLET | Refills: 0 | Status: SHIPPED | OUTPATIENT
Start: 2025-04-02 | End: 2025-07-01

## 2025-04-02 RX ORDER — METFORMIN HYDROCHLORIDE 1000 MG/1
1000 TABLET ORAL
Qty: 180 TABLET | Refills: 3 | Status: SHIPPED | OUTPATIENT
Start: 2025-04-02 | End: 2026-04-02

## 2025-04-02 RX ORDER — AMLODIPINE BESYLATE 10 MG/1
10 TABLET ORAL DAILY
Qty: 90 TABLET | Refills: 0 | Status: SHIPPED | OUTPATIENT
Start: 2025-04-02 | End: 2025-07-01

## 2025-04-02 RX ORDER — SPIRONOLACTONE AND HYDROCHLOROTHIAZIDE 25; 25 MG/1; MG/1
1 TABLET ORAL DAILY
Qty: 90 TABLET | Refills: 3 | Status: SHIPPED | OUTPATIENT
Start: 2025-04-02 | End: 2026-04-02

## 2025-04-02 ASSESSMENT — COLUMBIA-SUICIDE SEVERITY RATING SCALE - C-SSRS
1. IN THE PAST MONTH, HAVE YOU WISHED YOU WERE DEAD OR WISHED YOU COULD GO TO SLEEP AND NOT WAKE UP?: NO
2. HAVE YOU ACTUALLY HAD ANY THOUGHTS OF KILLING YOURSELF?: NO
6. HAVE YOU EVER DONE ANYTHING, STARTED TO DO ANYTHING, OR PREPARED TO DO ANYTHING TO END YOUR LIFE?: NO

## 2025-04-02 ASSESSMENT — ENCOUNTER SYMPTOMS
OCCASIONAL FEELINGS OF UNSTEADINESS: 0
LOSS OF SENSATION IN FEET: 0
DEPRESSION: 0

## 2025-04-02 ASSESSMENT — PATIENT HEALTH QUESTIONNAIRE - PHQ9
2. FEELING DOWN, DEPRESSED OR HOPELESS: NOT AT ALL
1. LITTLE INTEREST OR PLEASURE IN DOING THINGS: NOT AT ALL
SUM OF ALL RESPONSES TO PHQ9 QUESTIONS 1 AND 2: 0

## 2025-04-02 ASSESSMENT — PAIN SCALES - GENERAL: PAINLEVEL_OUTOF10: 0-NO PAIN

## 2025-04-02 NOTE — PROGRESS NOTES
59-year presents to establish care for yearly physical follow chronic medical conditions    Health Maintenance:  Eats a standard American diet.  Exercises regularly.  Does not drink, smoke, use illicit substances.  Due for mammogram and Otherwise up-to-date on all routine health maintenance screenings.  Due for immunizations.  Due for yearly lab work.    1. Wellness examination    2. Abnormal finding of blood chemistry, unspecified    3. Screening for thyroid disorder    4. Screening for diabetes mellitus    5. Screening for viral disease    6. Encounter for screening for cardiovascular disorders    7. Class 1 obesity due to excess calories without serious comorbidity with body mass index (BMI) of 31.0 to 31.9 in adult    8. Vitamin D deficiency    9. Essential (primary) hypertension    10. HTN (hypertension), benign    11. Type 2 diabetes mellitus without complications    12. Altered metabolism due to diabetes (Multi)    13. Iron deficiency anemia due to chronic blood loss    14. Screening mammogram for breast cancer        Obesity:  Difficulty losing weight interested in weight loss therapy    Hypertension:  Blood pressure 160/78 currently on spironolactone/hydrochlorothiazide.  Pressure has been steadily increasing.  Previous poor response to lisinopril/angioedema cannot take ACE inhibitors    Type 2 diabetes:   Currently insulin-dependent does not have CGM needs to check her blood sugar around 4 times daily has been having labile blood sugars recently      Iron deficiency anemia:  Currently gets iron infusions    All pertinent positive symptoms are included in history of present illness.    All other systems have been reviewed and are negative and noncontributory to this patient's current ailments.      CONSTITUTIONAL - INAD. Not ill appearing.  SKIN - No lesions or rashes visualized. Good skin turgor.  HEENT- Head is atraumatic and normocephalic. Nasal turbinates are nonerythematous and without drainage.  .  RESP - CTAB. No wheezing, rhonchi, or crackles.   CARDIAC - RRR. No murmurs, gallops, or rubs.  ABDOMEN - Soft, nontender, nondistended. No organomegaly.  NEURO - CNs 2-12 grossly intact.  PSYCH - Normal mood and affect        1. Wellness examination (Primary)  Health and wellness topics discussed today.  Recommended eating a varied and healthy diet and made dietary recommendations, also discussed exercise and exercising regularly 30 minutes a day 3 days a week.  Immunizations recommended and updated.  Health screening guidelines discussed with patient including possible things such as colonoscopies, mammograms, prostate screenings, lung cancer screenings, bone densitometry, and other wellness topics.  Yearly lab work ordered or reviewed today.  - CBC and Auto Differential; Future  - Comprehensive Metabolic Panel; Future  - Lipid Panel; Future  - Hemoglobin A1C; Future  - TSH with reflex to Free T4 if abnormal; Future  - Hepatitis C antibody; Future  - CBC and Auto Differential  - Comprehensive Metabolic Panel  - Lipid Panel  - Hemoglobin A1C  - TSH with reflex to Free T4 if abnormal  - Hepatitis C antibody    2. Abnormal finding of blood chemistry, unspecified    - CBC and Auto Differential; Future  - Comprehensive Metabolic Panel; Future  - Lipid Panel; Future  - Hemoglobin A1C; Future  - TSH with reflex to Free T4 if abnormal; Future  - Hepatitis C antibody; Future  - CBC and Auto Differential  - Comprehensive Metabolic Panel  - Lipid Panel  - Hemoglobin A1C  - TSH with reflex to Free T4 if abnormal  - Hepatitis C antibody    3. Screening for thyroid disorder    - CBC and Auto Differential; Future  - Comprehensive Metabolic Panel; Future  - Lipid Panel; Future  - Hemoglobin A1C; Future  - TSH with reflex to Free T4 if abnormal; Future  - Hepatitis C antibody; Future  - CBC and Auto Differential  - Comprehensive Metabolic Panel  - Lipid Panel  - Hemoglobin A1C  - TSH with reflex to Free T4 if abnormal  -  Hepatitis C antibody    4. Screening for diabetes mellitus    - CBC and Auto Differential; Future  - Comprehensive Metabolic Panel; Future  - Lipid Panel; Future  - Hemoglobin A1C; Future  - TSH with reflex to Free T4 if abnormal; Future  - Hepatitis C antibody; Future  - CBC and Auto Differential  - Comprehensive Metabolic Panel  - Lipid Panel  - Hemoglobin A1C  - TSH with reflex to Free T4 if abnormal  - Hepatitis C antibody    5. Screening for viral disease    - CBC and Auto Differential; Future  - Comprehensive Metabolic Panel; Future  - Lipid Panel; Future  - Hemoglobin A1C; Future  - TSH with reflex to Free T4 if abnormal; Future  - Hepatitis C antibody; Future  - CBC and Auto Differential  - Comprehensive Metabolic Panel  - Lipid Panel  - Hemoglobin A1C  - TSH with reflex to Free T4 if abnormal  - Hepatitis C antibody    6. Encounter for screening for cardiovascular disorders    - CBC and Auto Differential; Future  - Comprehensive Metabolic Panel; Future  - Lipid Panel; Future  - Hemoglobin A1C; Future  - TSH with reflex to Free T4 if abnormal; Future  - Hepatitis C antibody; Future  - CBC and Auto Differential  - Comprehensive Metabolic Panel  - Lipid Panel  - Hemoglobin A1C  - TSH with reflex to Free T4 if abnormal  - Hepatitis C antibody    7. Class 1 obesity due to excess calories without serious comorbidity with body mass index (BMI) of 31.0 to 31.9 in adult  Spoke about the natural history and course of obesity as well as the outcomes that can be expected secondary to this condition including effects on life expectancy, musculoskeletal pain, cardiovascular pathology, sleep apnea, and various other conditions associated with obesity.  My first recommendation is always an integrated approach to lifestyle interventions including a strict dietary regimen, whether that be plant-based diets, ketogenic diets, protein sparing modified fasts, or calorie restriction, as well as an integrated exercise program  including a recommendation to perform moderate to high intensity exercise 30 to 45 minutes every single day.  Exercise should include a mix of cardiovascular exercise and resistance training.  Other options possibly discussed included medication management including medications such as semaglutide, stimulants, and nontraditional medications such as bupropion/naltrexone.  If discussed, cost-benefit analysis of each was discussed with the patient.  I also possibly discussed therecommendation of bariatric surgery if indicated and the cost/benefit of this procedure.  Patient made an educated decision on one of these interventions at this time.  - CBC and Auto Differential; Future  - Comprehensive Metabolic Panel; Future  - Lipid Panel; Future  - Hemoglobin A1C; Future  - TSH with reflex to Free T4 if abnormal; Future  - Hepatitis C antibody; Future  - CBC and Auto Differential  - Comprehensive Metabolic Panel  - Lipid Panel  - Hemoglobin A1C  - TSH with reflex to Free T4 if abnormal  - Hepatitis C antibody    8. Vitamin D deficiency    - CBC and Auto Differential; Future  - Comprehensive Metabolic Panel; Future  - Lipid Panel; Future  - Hemoglobin A1C; Future  - TSH with reflex to Free T4 if abnormal; Future  - Vitamin D 25-Hydroxy,Total (for eval of Vitamin D levels); Future  - Hepatitis C antibody; Future  - CBC and Auto Differential  - Comprehensive Metabolic Panel  - Lipid Panel  - Hemoglobin A1C  - TSH with reflex to Free T4 if abnormal  - Vitamin D 25-Hydroxy,Total (for eval of Vitamin D levels)  - Hepatitis C antibody    9. Essential (primary) hypertension  Had a discussion with the patient about hypertension.  Discussed the natural history and course of hypertension and need for controlling blood pressure.  Discussed the cost benefit of treating hypertension with medication and how lowering blood pressure to goal levels will help reduce the risk of heart attacks and strokes in the future.     Discussed lifestyle  interventions including regular cardio aerobic exercise 30 minutes daily at least 3 times a week, hopefully more.  Discussed dietary interventions to help lower blood pressure.    Recommend patient get a blood pressure cuff and begin measuring blood pressure at home and keeping a log.  Advised the patient bring the log with them at their next visit so that we can find out the average blood pressure reading and determine whether or not the treatment is working.    If patient is already taking medication, I recommended continuing or changing medication depending on blood pressure control.    Appropriate lab work was ordered.  - CBC and Auto Differential; Future  - Comprehensive Metabolic Panel; Future  - Lipid Panel; Future  - Hemoglobin A1C; Future  - TSH with reflex to Free T4 if abnormal; Future  - Hepatitis C antibody; Future  - amLODIPine (Norvasc) 10 mg tablet; Take 1 tablet (10 mg) by mouth once daily.  Dispense: 90 tablet; Refill: 0  - CBC and Auto Differential  - Comprehensive Metabolic Panel  - Lipid Panel  - Hemoglobin A1C  - TSH with reflex to Free T4 if abnormal  - Hepatitis C antibody    10. HTN (hypertension), benign    - CBC and Auto Differential; Future  - Comprehensive Metabolic Panel; Future  - Lipid Panel; Future  - Hemoglobin A1C; Future  - TSH with reflex to Free T4 if abnormal; Future  - Hepatitis C antibody; Future  - spironolacton-hydrochlorothiaz (Aldactazide) 25-25 mg tablet; Take 1 tablet (25 mg) by mouth once daily.  Dispense: 90 tablet; Refill: 3  - CBC and Auto Differential  - Comprehensive Metabolic Panel  - Lipid Panel  - Hemoglobin A1C  - TSH with reflex to Free T4 if abnormal  - Hepatitis C antibody  - losartan (Cozaar) 50 mg tablet; Take 1 tablet (50 mg) by mouth once daily.  Dispense: 90 tablet; Refill: 0    11. Type 2 diabetes mellitus without complications  Spoke extensively about the natural course and history of type 2 diabetes.    Spoke about how glucose in the blood causes  blood vessel wall damage and can lead to end-organ damage and how it can damage the blood vessels that supply the nerves causing polyneuropathy, that supply the kidneys causing kidney failure, that supply the heart causing heart attacks, and that supply the brain causing strokes.  Spoke about the need to lower blood sugar is much as possible by interventions such as limiting carbohydrate intake, exercising, as well as using medications to remove glucose from the blood.    Discussion was had about the role of medications as well as lifestyle interventions and, my personal recommendation is an intensive lifestyle intervention including dietary and exercise programs to help reduce the need for medications in the future.  My personal recommendation for all diabetics is to undertake a very low carbohydrate diet such as a ketogenic diet and this may have been discussed with the patient if appropriate.    Conversation may have included information about medications such as metformin, SGLT2 inhibitors, DPP 4 inhibitors, GLP-1 agonitsts, sulfonylureas, and insulin, the side effects and expected benefits of each recommended medication, and the recommended monitoring for each.    Recommendations made to the patient included optimal medical therapy including recommending an ACE/ARB for renal protection as well as statin therapy for prevention of heart attacks and strokes.  Blood pressure goal of 130/80.  Recommend patient follow-up every 3 months for A1c checks and appropriate examinations as well as treatment protocol adjustments.   Appropriate lab work was ordered today.  - CBC and Auto Differential; Future  - Comprehensive Metabolic Panel; Future  - Lipid Panel; Future  - Hemoglobin A1C; Future  - TSH with reflex to Free T4 if abnormal; Future  - Hepatitis C antibody; Future  - Referral to Clinical Pharmacy; Future  - metFORMIN (Glucophage) 1,000 mg tablet; Take 1 tablet (1,000 mg) by mouth 2 times daily (morning and late  afternoon).  Dispense: 180 tablet; Refill: 3  - Vitamin B12; Future  - CBC and Auto Differential  - Comprehensive Metabolic Panel  - Lipid Panel  - Hemoglobin A1C  - TSH with reflex to Free T4 if abnormal  - Hepatitis C antibody  - Vitamin B12    12. Altered metabolism due to diabetes (Multi)    - CBC and Auto Differential; Future  - Comprehensive Metabolic Panel; Future  - Lipid Panel; Future  - Hemoglobin A1C; Future  - TSH with reflex to Free T4 if abnormal; Future  - Hepatitis C antibody; Future  - insulin glargine (Lantus) 100 unit/mL (3 mL) pen; Inject 20 Units under the skin once daily in the morning. Take as directed per insulin instructions.  Dispense: 18 mL; Refill: 0  - CBC and Auto Differential  - Comprehensive Metabolic Panel  - Lipid Panel  - Hemoglobin A1C  - TSH with reflex to Free T4 if abnormal  - Hepatitis C antibody    13. Iron deficiency anemia due to chronic blood loss  Check iron  - CBC and Auto Differential; Future  - Comprehensive Metabolic Panel; Future  - Lipid Panel; Future  - Hemoglobin A1C; Future  - TSH with reflex to Free T4 if abnormal; Future  - Hepatitis C antibody; Future  - Iron and TIBC; Future  - CBC and Auto Differential  - Comprehensive Metabolic Panel  - Lipid Panel  - Hemoglobin A1C  - TSH with reflex to Free T4 if abnormal  - Hepatitis C antibody  - Iron and TIBC    14. Screening mammogram for breast cancer    - BI mammo bilateral screening tomosynthesis; Future  - Hepatitis C antibody; Future  - Hepatitis C antibody

## 2025-04-03 ENCOUNTER — INFUSION (OUTPATIENT)
Dept: HEMATOLOGY/ONCOLOGY | Facility: HOSPITAL | Age: 60
End: 2025-04-03
Payer: COMMERCIAL

## 2025-04-03 VITALS
SYSTOLIC BLOOD PRESSURE: 127 MMHG | OXYGEN SATURATION: 94 % | HEART RATE: 87 BPM | DIASTOLIC BLOOD PRESSURE: 77 MMHG | TEMPERATURE: 96.4 F | RESPIRATION RATE: 16 BRPM

## 2025-04-03 DIAGNOSIS — D50.9 IRON DEFICIENCY ANEMIA, UNSPECIFIED IRON DEFICIENCY ANEMIA TYPE: ICD-10-CM

## 2025-04-03 LAB
ALBUMIN SERPL BCP-MCNC: 4.7 G/DL (ref 3.4–5)
ALP SERPL-CCNC: 63 U/L (ref 33–136)
ALT SERPL W P-5'-P-CCNC: 38 U/L (ref 7–45)
ANION GAP SERPL CALC-SCNC: 20 MMOL/L (ref 10–20)
AST SERPL W P-5'-P-CCNC: 43 U/L (ref 9–39)
BASOPHILS # BLD AUTO: 0.03 X10*3/UL (ref 0–0.1)
BASOPHILS NFR BLD AUTO: 0.3 %
BILIRUB SERPL-MCNC: 0.4 MG/DL (ref 0–1.2)
BUN SERPL-MCNC: 30 MG/DL (ref 6–23)
CALCIUM SERPL-MCNC: 10.7 MG/DL (ref 8.6–10.3)
CHLORIDE SERPL-SCNC: 96 MMOL/L (ref 98–107)
CO2 SERPL-SCNC: 23 MMOL/L (ref 21–32)
CREAT SERPL-MCNC: 1.43 MG/DL (ref 0.5–1.05)
EGFRCR SERPLBLD CKD-EPI 2021: 42 ML/MIN/1.73M*2
EOSINOPHIL # BLD AUTO: 0.07 X10*3/UL (ref 0–0.7)
EOSINOPHIL NFR BLD AUTO: 0.7 %
ERYTHROCYTE [DISTWIDTH] IN BLOOD BY AUTOMATED COUNT: 16.3 % (ref 11.5–14.5)
GLUCOSE SERPL-MCNC: 115 MG/DL (ref 74–99)
HCT VFR BLD AUTO: 43 % (ref 36–46)
HGB BLD-MCNC: 13.8 G/DL (ref 12–16)
IMM GRANULOCYTES # BLD AUTO: 0.1 X10*3/UL (ref 0–0.7)
IMM GRANULOCYTES NFR BLD AUTO: 1 % (ref 0–0.9)
IRON SATN MFR SERPL: 14 % (ref 25–45)
IRON SERPL-MCNC: 52 UG/DL (ref 35–150)
LYMPHOCYTES # BLD AUTO: 2.69 X10*3/UL (ref 1.2–4.8)
LYMPHOCYTES NFR BLD AUTO: 27.6 %
MCH RBC QN AUTO: 27.7 PG (ref 26–34)
MCHC RBC AUTO-ENTMCNC: 32.1 G/DL (ref 32–36)
MCV RBC AUTO: 86 FL (ref 80–100)
MONOCYTES # BLD AUTO: 0.51 X10*3/UL (ref 0.1–1)
MONOCYTES NFR BLD AUTO: 5.2 %
NEUTROPHILS # BLD AUTO: 6.35 X10*3/UL (ref 1.2–7.7)
NEUTROPHILS NFR BLD AUTO: 65.2 %
NRBC BLD-RTO: 0 /100 WBCS (ref 0–0)
PLATELET # BLD AUTO: 407 X10*3/UL (ref 150–450)
POTASSIUM SERPL-SCNC: 4.7 MMOL/L (ref 3.5–5.3)
PROT SERPL-MCNC: 8.3 G/DL (ref 6.4–8.2)
RBC # BLD AUTO: 4.99 X10*6/UL (ref 4–5.2)
SODIUM SERPL-SCNC: 134 MMOL/L (ref 136–145)
TIBC SERPL-MCNC: 377 UG/DL (ref 240–445)
TSH SERPL-ACNC: 1 MIU/L (ref 0.44–3.98)
UIBC SERPL-MCNC: 325 UG/DL (ref 110–370)
WBC # BLD AUTO: 9.8 X10*3/UL (ref 4.4–11.3)

## 2025-04-03 PROCEDURE — 85025 COMPLETE CBC W/AUTO DIFF WBC: CPT | Performed by: FAMILY MEDICINE

## 2025-04-03 PROCEDURE — 84443 ASSAY THYROID STIM HORMONE: CPT | Performed by: FAMILY MEDICINE

## 2025-04-03 PROCEDURE — 83540 ASSAY OF IRON: CPT | Performed by: FAMILY MEDICINE

## 2025-04-03 PROCEDURE — 86803 HEPATITIS C AB TEST: CPT | Mod: GENLAB | Performed by: FAMILY MEDICINE

## 2025-04-03 PROCEDURE — 2500000004 HC RX 250 GENERAL PHARMACY W/ HCPCS (ALT 636 FOR OP/ED): Mod: SE | Performed by: INTERNAL MEDICINE

## 2025-04-03 PROCEDURE — 96365 THER/PROPH/DIAG IV INF INIT: CPT | Mod: INF

## 2025-04-03 PROCEDURE — 83550 IRON BINDING TEST: CPT | Performed by: FAMILY MEDICINE

## 2025-04-03 PROCEDURE — 80053 COMPREHEN METABOLIC PANEL: CPT | Performed by: FAMILY MEDICINE

## 2025-04-03 PROCEDURE — 82607 VITAMIN B-12: CPT | Mod: GENLAB | Performed by: FAMILY MEDICINE

## 2025-04-03 PROCEDURE — 82306 VITAMIN D 25 HYDROXY: CPT | Mod: GENLAB | Performed by: FAMILY MEDICINE

## 2025-04-03 PROCEDURE — 83036 HEMOGLOBIN GLYCOSYLATED A1C: CPT | Mod: GENLAB | Performed by: FAMILY MEDICINE

## 2025-04-03 RX ORDER — DIPHENHYDRAMINE HYDROCHLORIDE 50 MG/ML
50 INJECTION, SOLUTION INTRAMUSCULAR; INTRAVENOUS AS NEEDED
OUTPATIENT
Start: 2025-04-06

## 2025-04-03 RX ORDER — EPINEPHRINE 0.3 MG/.3ML
0.3 INJECTION SUBCUTANEOUS EVERY 5 MIN PRN
OUTPATIENT
Start: 2025-04-06

## 2025-04-03 RX ORDER — EPINEPHRINE 0.3 MG/.3ML
0.3 INJECTION SUBCUTANEOUS EVERY 5 MIN PRN
Status: DISCONTINUED | OUTPATIENT
Start: 2025-04-03 | End: 2025-04-03 | Stop reason: HOSPADM

## 2025-04-03 RX ORDER — FAMOTIDINE 10 MG/ML
20 INJECTION, SOLUTION INTRAVENOUS ONCE AS NEEDED
OUTPATIENT
Start: 2025-04-06

## 2025-04-03 RX ORDER — ALBUTEROL SULFATE 0.83 MG/ML
3 SOLUTION RESPIRATORY (INHALATION) AS NEEDED
Status: DISCONTINUED | OUTPATIENT
Start: 2025-04-03 | End: 2025-04-03 | Stop reason: HOSPADM

## 2025-04-03 RX ORDER — ALBUTEROL SULFATE 0.83 MG/ML
3 SOLUTION RESPIRATORY (INHALATION) AS NEEDED
OUTPATIENT
Start: 2025-04-06

## 2025-04-03 RX ORDER — DIPHENHYDRAMINE HYDROCHLORIDE 50 MG/ML
50 INJECTION, SOLUTION INTRAMUSCULAR; INTRAVENOUS AS NEEDED
Status: DISCONTINUED | OUTPATIENT
Start: 2025-04-03 | End: 2025-04-03 | Stop reason: HOSPADM

## 2025-04-03 RX ORDER — FAMOTIDINE 10 MG/ML
20 INJECTION, SOLUTION INTRAVENOUS ONCE AS NEEDED
Status: DISCONTINUED | OUTPATIENT
Start: 2025-04-03 | End: 2025-04-03 | Stop reason: HOSPADM

## 2025-04-03 RX ADMIN — IRON SUCROSE 300 MG: 20 INJECTION, SOLUTION INTRAVENOUS at 14:03

## 2025-04-03 ASSESSMENT — PATIENT HEALTH QUESTIONNAIRE - PHQ9
SUM OF ALL RESPONSES TO PHQ9 QUESTIONS 1 & 2: 0
1. LITTLE INTEREST OR PLEASURE IN DOING THINGS: NOT AT ALL
2. FEELING DOWN, DEPRESSED OR HOPELESS: NOT AT ALL

## 2025-04-03 ASSESSMENT — PAIN SCALES - GENERAL: PAINLEVEL_OUTOF10: 0-NO PAIN

## 2025-04-04 LAB
25(OH)D3 SERPL-MCNC: 202 NG/ML (ref 30–100)
EST. AVERAGE GLUCOSE BLD GHB EST-MCNC: 134 MG/DL
HBA1C MFR BLD: 6.3 %
HCV AB SER QL: NONREACTIVE
VIT B12 SERPL-MCNC: 619 PG/ML (ref 211–911)

## 2025-04-04 NOTE — RESULT ENCOUNTER NOTE
CBC non-concerning, no signs of anemias or other obvious blood disorders..  Iron levels within normal limits.  CMP came back showing: AST is mildly elevated not concerning at this time.  Normal ALT.  Recommend against heavy alcohol or Tylenol use.  For chronic kidney disease monitoring, your creatinine is 1.43.  Stage IIIb chronic kidney disease.  Avoid the regular use of nephrotoxic medications such as NSAIDs (ibuprofen, naproxen, etc) unless directed by a physician.  Maintain adequate hydration, and maintain blood pressure below 130/80 to prevent further deterioration of kidney function.  Routine monitoring and blood work as recommended at least every 3 to 6 months.  Nephrology consult placed.  Lipid panel results not available  For diabetes monitoring, your A1c is at 6.3.  Goal is less than 7.0. You are at goal.  Continue diet and medications as prescribed.  No changes necessary.  TSH is normal.  No concern for thyroid issues.  Vitamin D level was high.  Hypercalcemia is present if you take a vitamin D supplement, I recommend discontinuing it and follow-up in 6 months to recheck.  If you do not take a vitamin D supplement, please follow-up with me for consideration of further testing.

## 2025-04-09 NOTE — PROGRESS NOTES
"[facility]  New Patient Office Visit Note 4/10/2025    Patient Information: Caty Lal, MRN: [id number], : 1965   Primary Care/Referring Physician: Bryant Aguilar DO, [id number] / [place] [address]     Chief Complaint: ***  History of Pain: Ms. Caty Lal is a 59 y.o. female with a PMHx of HTN, T2DM, CKD who presents for ***.    ?chronic abdominal pain. Reportedly peptic ulcer seen on CT but not seen endoscopically, however later admitted for GI bleed and an ulcer was found and clipped. Thought to be NSAID induced    Current Pain Medications: Tramadol 50 mg q8h PRN  Previously Tried Pain Medications:    Relevant Surgeries: ***  Injections: ***  Physical/Occupational Therapy: ***The patient does not wish to pursue PT/OT at this time.    Medications:   Current Outpatient Medications   Medication Instructions    amLODIPine (NORVASC) 10 mg, oral, Daily    blood sugar diagnostic (True Metrix Glucose Test Strip) strip 1 strip, miscellaneous, Daily    FreeStyle Lite Strips strip test AS DIRECTED TWICE DAILY    insulin glargine (LANTUS) 20 Units, subcutaneous, Every morning, Take as directed per insulin instructions.    losartan (COZAAR) 50 mg, oral, Daily    metFORMIN (GLUCOPHAGE) 1,000 mg, oral, 2 times daily (morning and late afternoon)    omeprazole (PRILOSEC) 40 mg, oral, 2 times daily    ondansetron ODT (ZOFRAN-ODT) 8 mg, Every 8 hours PRN    pen needle, diabetic (Pen Needle) 31 gauge x 3/16\" needle Use twice per day.    spironolacton-hydrochlorothiaz (Aldactazide) 25-25 mg tablet 25 mg, oral, Daily    traMADol (ULTRAM) 50 mg, oral, Every 8 hours    Unilet Lancet 33 gauge misc use to test blood sugar once daily      Allergies:   Allergies   Allergen Reactions    Actos [Pioglitazone] Other     Chest pain/tightness    Doxycycline Angioedema    Liraglutide Nausea/vomiting    Lisinopril Angioedema    Cefdinir Headache and Nausea/vomiting    Pseudoephedrine Headache and Other "       Past Medical & Surgical History:  No past medical history on file. No past surgical history on file.    No family history on file.  Social History     Socioeconomic History    Marital status:      Spouse name: Not on file    Number of children: Not on file    Years of education: Not on file    Highest education level: Not on file   Occupational History    Not on file   Tobacco Use    Smoking status: Never     Passive exposure: Never    Smokeless tobacco: Never   Substance and Sexual Activity    Alcohol use: Never    Drug use: Never    Sexual activity: Not on file   Other Topics Concern    Not on file   Social History Narrative    Not on file     Social Drivers of Health     Financial Resource Strain: Low Risk  (10/10/2024)    Overall Financial Resource Strain (CARDIA)     Difficulty of Paying Living Expenses: Not very hard   Food Insecurity: No Food Insecurity (10/29/2024)    Received from [facility]    Hunger Vital Sign     Worried About Running Out of Food in the Last Year: Never true     Ran Out of Food in the Last Year: Never true   Transportation Needs: No Transportation Needs (10/29/2024)    Received from [facility]    PRAPARE - Transportation     Lack of Transportation (Medical): No     Lack of Transportation (Non-Medical): No   Physical Activity: Not on file   Stress: No Stress Concern Present (4/3/2025)    [employer] - Occupational Stress Questionnaire     Feeling of Stress : Not at all   Social Connections: Not on file   Intimate Partner Violence: Not on file   Housing Stability: Unknown (10/29/2024)    Received from [facility]    Housing Stability Vital Sign     Unable to Pay for Housing in the Last Year: No     Number of Times Moved in the Last Year: Not on file     Homeless in the Last Year: No       Problems, Past medical history, past surgical history, Medications, allergies, social and family history reviewed and as per the electronic  medical record from today's encounter    Review of Systems:  CONST: No fever, chills, fatigue, weight changes  EYES: No loss of vision  ENT: No hearing loss, tinnitus  CV: No chest pain, palpitations  RESP: No dyspnea, shortness of breath, cough  GI: No stool incontinence, nausea, vomiting  : No urinary incontinence  MSK: No joint swelling  SKIN: No rash, no hives  NEURO: No headache, dizziness, weakness, paresthesias  PSYCH: No anxiety, depression or suicidal ideation  HEM/LYMPH: No easy bruising or bleeding  All other systems reviewed are negative     Physical Exam:  Vitals: There were no vitals taken for this visit.  General: No apparent distress. Alert, appropriate, oriented x 3. Mood and affect normal. Speaking in full sentences.  HENT: Normocephalic, atraumatic. Hearing intact.  Eyes: Extraocular movements grossly intact. Pupils equal and round.   Neck: Supple, trachea midline.  Lungs: Symmetric respiratory excursion on visual exam, nonlabored breathing.  Extremities: No clubbing, cyanosis, or edema noted in arms or legs.  Skin: No rashes, lesions, alopecia noted on back or extremities.   MSK: ***  Neck: Flexion, extension, rotation and lateral bending does not exacerbate pain. No tenderness over the spinous processes, cervical paraspinal muscles, or bilateral trapezius muscles.  Back: Flexion, extension, rotation and lateral bending does*** exacerbate pain. No tenderness over the spinous processes, lumbar facets, SIJ, or GTB bilaterally. KUN test and straight leg raise test negative bilaterally. No spasm noted over musculature. No misalignment or asymmetry noted.  Neuro: Alert and appropriate. Motor strength 5/5 throughout bilateral upper and lower extremities. Sensory intact to light touch bilateral upper and lower extremities. Gait within normal limits. Bulk and tone within normal limits.    Laboratory Data:  The following laboratory data were reviewed during this visit:   Lab Results   Component Value  "Date    WBC 9.8 04/03/2025    RBC 4.99 04/03/2025    HGB 13.8 04/03/2025    HCT 43.0 04/03/2025     04/03/2025      No results found for: \"INR\"  Lab Results   Component Value Date    CREATININE 1.43 (H) 04/03/2025    HGBA1C 6.3 (H) 04/03/2025       Imaging:  The following imaging impressions were reviewed by me during this visit:    No results found for this or any previous visit from the past 180 days.       I also personally reviewed the images from the above studies myself. These images and my interpretation of them contributed to the management and decision making of the patient's medical plan.    ASSESSMENT:  Ms. Caty Lal is a 59 y.o. female with *** pain that is consistent with:    No diagnosis found.    PLAN:    Diagnostics: No further diagnostics are indicated at this time.    Physical Therapy and Rehabilitation:     - ***    Psychologically:  - ***    Medications  - ***  Duration  - ***    Interventions:  - ***        Sincerely,  Ike Starkey MD  [facility] - [place]  "

## 2025-04-10 ENCOUNTER — APPOINTMENT (OUTPATIENT)
Dept: PAIN MEDICINE | Facility: CLINIC | Age: 60
End: 2025-04-10
Payer: COMMERCIAL

## 2025-04-15 ENCOUNTER — APPOINTMENT (OUTPATIENT)
Dept: PHARMACY | Facility: HOSPITAL | Age: 60
End: 2025-04-15
Payer: COMMERCIAL

## 2025-04-15 DIAGNOSIS — E11.9 TYPE 2 DIABETES MELLITUS WITHOUT COMPLICATIONS: ICD-10-CM

## 2025-04-15 PROCEDURE — RXMED WILLOW AMBULATORY MEDICATION CHARGE

## 2025-04-15 RX ORDER — BLOOD-GLUCOSE SENSOR
EACH MISCELLANEOUS
Qty: 2 EACH | Refills: 11 | Status: SHIPPED | OUTPATIENT
Start: 2025-04-15

## 2025-04-15 RX ORDER — DULAGLUTIDE 0.75 MG/.5ML
0.75 INJECTION, SOLUTION SUBCUTANEOUS
Qty: 2 ML | Refills: 2 | Status: SHIPPED | OUTPATIENT
Start: 2025-04-15

## 2025-04-15 NOTE — Clinical Note
Dr. Lauren Marinelli I spoke with MsLukasz about her diabetes. An appointment is set up for her CGM training with me next week for better monitoring. Reports lows into 60s and down into 30s recently. Reduced her dose of Lantus to prevent this. Discussed treatment with Trulicity (covered by her insurance). Self reports a hx of pancreatitis, states this was how she was diagnosed with DM ~20 years ago. All pancreatitic enzyme labs within [facility] are WNL, had no issues when previously on Victoza. Discussed risks and aware to seek evaluation if severe abdominal pain while taking. Please let me know if questions!

## 2025-04-15 NOTE — PROGRESS NOTES
Clinical Pharmacy Appointment    Patient ID: Caty Lal is a 59 y.o. female who presents for Diabetes.    Pt is here for First appointment.     Referring Provider: Bryant Aguilar DO  PCP: Bryant Aguilar DO   Last visit with PCP: 04/02/25   Next visit with PCP: 07/08/25    Subjective   Medication Reconciliation:  Changed: n/a  Added: n/a  Discontinued: n/a    Drug Interactions  No relevant drug interactions were noted.    Medication System Management  Patient's preferred pharmacy: [facility]  Adherence/Organization: no issues  Affordability/Accessibility: no issues    HPI    DIABETES MELLITUS TYPE 2:    Diagnosed with diabetes:  20 years ago.  Does patient follow with Endocrinology: Not at this time  Last optometry exam: 12/2024  Patient denies sores or cuts on feet today      Current diabetic medications include:  Lantus 20 units AM and 16 units PM  Metformin 1000mg BID    Clarifications to above regimen: n/a  Adverse Effects: none currently noted    Past diabetic medications include:  Victoza - stomach issues  Byetta  Actos  Glipizide    Glucose Readings:  Glucometer/CGM Type: True Metrix  Patient tests BG 3-4 times per day    Current home BG readings (mg/dL): -160s, evening upper 100s to 200s  - very labile  Discussed CGM to monitor glucose more closely - interested in this and apt made for training    Any episodes of hypoglycemia? Yes, 60 about 1 week ago and 39 previous to that, both overnight .  Did patient treat episode of hypoglycemia appropriately? Yes,    Does the patient have a prescription for ready-to-use Glucagon? No will discuss at CGM visit  Does pt have proteinuria? needs assessed     Secondary Prevention:  Statin? No  ACE-I/ARB? Yes  Aspirin? No    Pertinent PMH Review:  PMH of Pancreatitis: Yes- reports this was how she was originally diagnosed with DM  No recent elevated amylase/lipase  Reviewed risk of reoccurence, no issues with this while on Byetta or Victoza  PMH of  "Retinopathy: No  PMH of MTC: No  Gall bladder out    Objective   Allergies   Allergen Reactions    Actos [Pioglitazone] Other     Chest pain/tightness    Doxycycline Angioedema    Liraglutide Nausea/vomiting    Lisinopril Angioedema    Cefdinir Headache and Nausea/vomiting    Pseudoephedrine Headache and Other     Social History     Social History Narrative    Not on file      Medication Review  Current Outpatient Medications   Medication Instructions    amLODIPine (NORVASC) 10 mg, oral, Daily    blood sugar diagnostic (True Metrix Glucose Test Strip) strip 1 strip, miscellaneous, Daily    blood-glucose sensor (FreeStyle Rajiv 3 Plus Sensor) device Use to monitor glucose, change every 15 days    FreeStyle Lite Strips strip test AS DIRECTED TWICE DAILY    insulin glargine (LANTUS) 20 Units, subcutaneous, Every morning, Take as directed per insulin instructions.    losartan (COZAAR) 50 mg, oral, Daily    metFORMIN (GLUCOPHAGE) 1,000 mg, oral, 2 times daily (morning and late afternoon)    omeprazole (PRILOSEC) 40 mg, oral, 2 times daily    ondansetron ODT (ZOFRAN-ODT) 8 mg, Every 8 hours PRN    pen needle, diabetic (Pen Needle) 31 gauge x 3/16\" needle Use twice per day.    spironolacton-hydrochlorothiaz (Aldactazide) 25-25 mg tablet 25 mg, oral, Daily    traMADol (ULTRAM) 50 mg, oral, Every 8 hours    Trulicity 0.75 mg, subcutaneous, Every 7 days    Unilet Lancet 33 gauge misc use to test blood sugar once daily      Vitals  BP Readings from Last 2 Encounters:   04/03/25 127/77   04/02/25 160/78     BMI Readings from Last 1 Encounters:   04/02/25 31.91 kg/m²      Labs  A1C  Lab Results   Component Value Date    HGBA1C 6.3 (H) 04/03/2025    HGBA1C 6.1 (H) 10/30/2024    HGBA1C 5.8 (H) 09/25/2024     BMP  Lab Results   Component Value Date    CALCIUM 10.7 (H) 04/03/2025     (L) 04/03/2025    K 4.7 04/03/2025    CO2 23 04/03/2025    CL 96 (L) 04/03/2025    BUN 30 (H) 04/03/2025    CREATININE 1.43 (H) 04/03/2025    " EGFR 42 (L) 04/03/2025     LFTs  Lab Results   Component Value Date    ALT 38 04/03/2025    AST 43 (H) 04/03/2025    ALKPHOS 63 04/03/2025    BILITOT 0.4 04/03/2025     FLP  Lab Results   Component Value Date    TRIG 234 (H) 07/25/2023    CHOL 251 (H) 07/25/2023    LDLCALC 153 (H) 07/25/2023    HDL 51 07/25/2023     Urine Microalbumin  Lab Results   Component Value Date    MICROALBCREA 30.9 (H) 07/25/2023     Weight Management  Wt Readings from Last 3 Encounters:   04/02/25 71.7 kg (158 lb)   03/05/25 72.6 kg (160 lb 0.9 oz)   01/24/25 71.2 kg (157 lb)      There is no height or weight on file to calculate BMI.     Assessment/Plan   Problem List Items Addressed This Visit    None  Visit Diagnoses       Type 2 diabetes mellitus without complications        Relevant Medications    dulaglutide (Trulicity) 0.75 mg/0.5 mL pen injector    blood-glucose sensor (FreeStyle Rajiv 3 Plus Sensor) device    Other Relevant Orders    Referral to Clinical Pharmacy    Referral to Clinical Pharmacy          Patient's goal A1c is < 7%.  Is pt at goal? Yes, 6.3%  Patient's SMBGs are labile, some at goal other too high or low.     Rationale for plan:   - CGM to help monitor glucose and catch lows - apt made for training   - GLP1 to help with weight loss and decrease insulin requirements  - decrease basal insulin to prevent severe hypoglycemic episodes    Medication Changes:  CONTINUE  Metformin 1000mg BID    START  Trulicity 0.75mg subcutaneous once weekly    DECREASE  Lantus 20 units once daily    Future Considerations:  GLP titration    Monitoring and Education:    Patient received the following medication education  on Trulicity:    - Counseled patient on Trulicity MOA, expectations, side effects, duration of therapy, administration, and monitoring parameters.  - Provided detailed dosing and administration counseling to ensure proper technique.   - Reviewed Trulicity titration schedule, starting with 0.75 mg once weekly to 1.5 mg, 3  mg, and if tolerated 4.5 mg.  - Counseled patient on the benefits of GLP-1ra, such as cardiovascular risk reduction, glycemic control, and weight loss potential.  - Reviewed storage requirements of Trulicity when not in use, and when to administer the medication if a dose is missed.  - Advised patient that they may experience improved satiety after meals and portion sizes of meals may be reduced as doses of Trulicity increase.    Aware of risk of pancreatitis risk recurrence, instructed to go to ER for evaluation with any signs/sx    Medication education provided by RAFFY Mackey PharmD, [facility]      Clinical Pharmacist follow-up: 1 week, In-Person visit  Patient is not followed in [facility]. (If yes, track minutes under compass lawrence at each visit)    Continue all meds under the continuation of care with the referring provider and clinical pharmacy team.    Verbal consent to manage patient's drug therapy was obtained from the patient. They were informed they may decline to participate or withdraw from participation in pharmacy services at any time.

## 2025-04-22 ENCOUNTER — APPOINTMENT (OUTPATIENT)
Dept: PRIMARY CARE | Facility: CLINIC | Age: 60
End: 2025-04-22
Payer: COMMERCIAL

## 2025-04-22 ENCOUNTER — PHARMACY VISIT (OUTPATIENT)
Dept: PHARMACY | Facility: CLINIC | Age: 60
End: 2025-04-22
Payer: MEDICAID

## 2025-04-24 ENCOUNTER — APPOINTMENT (OUTPATIENT)
Dept: PRIMARY CARE | Facility: CLINIC | Age: 60
End: 2025-04-24
Payer: COMMERCIAL

## 2025-04-30 ENCOUNTER — TELEPHONE (OUTPATIENT)
Dept: HEMATOLOGY/ONCOLOGY | Facility: HOSPITAL | Age: 60
End: 2025-04-30
Payer: COMMERCIAL

## 2025-04-30 ENCOUNTER — OFFICE VISIT (OUTPATIENT)
Dept: PAIN MEDICINE | Facility: CLINIC | Age: 60
End: 2025-04-30
Payer: COMMERCIAL

## 2025-04-30 ENCOUNTER — HOSPITAL ENCOUNTER (OUTPATIENT)
Dept: RADIOLOGY | Facility: CLINIC | Age: 60
Discharge: HOME | End: 2025-04-30
Payer: COMMERCIAL

## 2025-04-30 VITALS
HEART RATE: 88 BPM | BODY MASS INDEX: 31.85 KG/M2 | HEIGHT: 59 IN | WEIGHT: 158 LBS | RESPIRATION RATE: 18 BRPM | DIASTOLIC BLOOD PRESSURE: 80 MMHG | OXYGEN SATURATION: 98 % | SYSTOLIC BLOOD PRESSURE: 136 MMHG

## 2025-04-30 DIAGNOSIS — M48.061 SPINAL STENOSIS OF LUMBAR REGION, UNSPECIFIED WHETHER NEUROGENIC CLAUDICATION PRESENT: Primary | ICD-10-CM

## 2025-04-30 DIAGNOSIS — M79.18 MYOFASCIAL PAIN: ICD-10-CM

## 2025-04-30 DIAGNOSIS — M70.62 GREATER TROCHANTERIC BURSITIS OF LEFT HIP: ICD-10-CM

## 2025-04-30 DIAGNOSIS — R10.32 LEFT GROIN PAIN: ICD-10-CM

## 2025-04-30 DIAGNOSIS — Z79.891 LONG-TERM CURRENT USE OF OPIATE ANALGESIC: ICD-10-CM

## 2025-04-30 DIAGNOSIS — M43.16 SPONDYLOLISTHESIS OF LUMBAR REGION: ICD-10-CM

## 2025-04-30 PROCEDURE — 1036F TOBACCO NON-USER: CPT | Performed by: STUDENT IN AN ORGANIZED HEALTH CARE EDUCATION/TRAINING PROGRAM

## 2025-04-30 PROCEDURE — 73502 X-RAY EXAM HIP UNI 2-3 VIEWS: CPT | Mod: LT

## 2025-04-30 PROCEDURE — 3044F HG A1C LEVEL LT 7.0%: CPT | Performed by: STUDENT IN AN ORGANIZED HEALTH CARE EDUCATION/TRAINING PROGRAM

## 2025-04-30 PROCEDURE — 3079F DIAST BP 80-89 MM HG: CPT | Performed by: STUDENT IN AN ORGANIZED HEALTH CARE EDUCATION/TRAINING PROGRAM

## 2025-04-30 PROCEDURE — 3075F SYST BP GE 130 - 139MM HG: CPT | Performed by: STUDENT IN AN ORGANIZED HEALTH CARE EDUCATION/TRAINING PROGRAM

## 2025-04-30 PROCEDURE — 3008F BODY MASS INDEX DOCD: CPT | Performed by: STUDENT IN AN ORGANIZED HEALTH CARE EDUCATION/TRAINING PROGRAM

## 2025-04-30 PROCEDURE — 4010F ACE/ARB THERAPY RXD/TAKEN: CPT | Performed by: STUDENT IN AN ORGANIZED HEALTH CARE EDUCATION/TRAINING PROGRAM

## 2025-04-30 PROCEDURE — 99214 OFFICE O/P EST MOD 30 MIN: CPT | Performed by: STUDENT IN AN ORGANIZED HEALTH CARE EDUCATION/TRAINING PROGRAM

## 2025-04-30 PROCEDURE — 99204 OFFICE O/P NEW MOD 45 MIN: CPT | Performed by: STUDENT IN AN ORGANIZED HEALTH CARE EDUCATION/TRAINING PROGRAM

## 2025-04-30 PROCEDURE — 72114 X-RAY EXAM L-S SPINE BENDING: CPT

## 2025-04-30 RX ORDER — DULOXETIN HYDROCHLORIDE 30 MG/1
30 CAPSULE, DELAYED RELEASE ORAL DAILY
Qty: 30 CAPSULE | Refills: 1 | Status: SHIPPED | OUTPATIENT
Start: 2025-04-30 | End: 2025-06-29

## 2025-04-30 ASSESSMENT — PAIN SCALES - GENERAL
PAINLEVEL_OUTOF10: 7
PAINLEVEL_OUTOF10: 7

## 2025-04-30 ASSESSMENT — LIFESTYLE VARIABLES: TOTAL SCORE: 1

## 2025-04-30 ASSESSMENT — PAIN - FUNCTIONAL ASSESSMENT: PAIN_FUNCTIONAL_ASSESSMENT: 0-10

## 2025-04-30 ASSESSMENT — PAIN DESCRIPTION - DESCRIPTORS: DESCRIPTORS: STABBING;ACHING

## 2025-04-30 ASSESSMENT — ENCOUNTER SYMPTOMS
DEPRESSION: 0
OCCASIONAL FEELINGS OF UNSTEADINESS: 0
LOSS OF SENSATION IN FEET: 1

## 2025-04-30 NOTE — TELEPHONE ENCOUNTER
"Reason for ConversationCaty Lal (MRN: [id number]): Patient called with concerns regarding iron levels. Per patient, after her last infusion, she saw Dr. Aguilar and was informed by someone in their office that her iron levels were still low. However, per Dr. Augilar's documentation, \"CBC non-concerning, no signs of anemias or other obvious blood disorders. Iron levels within normal limits.\" Patient reports ongoing fatigue and the presence of nail ridges. Patient stated she did some research and believes she may not be absorbing iron correctly due to a possible copper deficiency. Requesting a call back at [phone].   contacted patient, wants to report to you that she \"still feels fatigued, the ridges in her nails are returning again and she is experiencing some hair loss\"         lets check her labs. I placed orders per Dr. Pablo        Contacted patient, notified lab work orders have been placed for her to have drawn.   "

## 2025-04-30 NOTE — PROGRESS NOTES
"Pending sale to [facility]  New Patient Office Visit Note 2025    Patient Information: Caty Lal, MRN: [id number], : 1965   Primary Care/Referring Physician: Bryant Aguilar DO, [address] [facility] [address]     Chief Complaint: Left low back, leg, and groin pain  History of Pain: Ms. Caty Lal is a 59 y.o. female with a PMHx of HTN, T2DM, CKD who presents for low back and left buttock/groin pain.    She reports onset of pain around 2-3 years ago. She started working as a  and feels this contributed to her pain, but denies any obvious injury. She describes pain starting in her left low back/buttock, which radiates around towards her left groin. At night she feels a lot of \"pressure\" in her left groin area. She reports worsening pain with sitting, and when getting  up from a seated position. She feels very stiff and it takes her a while to straighten her back after getting up from a seated position. When she is up and walking her pain gradually improves. She reports having a \"torn disc\" in her low back in the past but the details of this are unclear. She denies any constant numbness, tingling or weakness in her legs.    Current Pain Medications: Tramadol 50 mg q8h PRN  Previously Tried Pain Medications: OTC Aleve - stopped after GI ulcer and CKD, Tylenol - stopped due to elevated LFT's, Soma    Relevant Surgeries: Denies lumbar spine or hip surgery  Injections: Denies  Physical/Occupational Therapy:  Denies any recent PT    Medications:   Current Outpatient Medications   Medication Instructions    amLODIPine (NORVASC) 10 mg, oral, Daily    blood sugar diagnostic (True Metrix Glucose Test Strip) strip 1 strip, miscellaneous, Daily    blood-glucose sensor (FreeStyle Rajiv 3 Plus Sensor) device Use to monitor glucose, change every 15 days    DULoxetine (CYMBALTA) 30 mg, oral, Daily, Do not crush or chew.    FreeStyle Lite Strips strip test AS DIRECTED TWICE DAILY    insulin " "glargine (LANTUS) 20 Units, subcutaneous, Every morning, Take as directed per insulin instructions.    losartan (COZAAR) 50 mg, oral, Daily    metFORMIN (GLUCOPHAGE) 1,000 mg, oral, 2 times daily (morning and late afternoon)    omeprazole (PRILOSEC) 40 mg, oral, 2 times daily    ondansetron ODT (ZOFRAN-ODT) 8 mg, Every 8 hours PRN    pen needle, diabetic (Pen Needle) 31 gauge x 3/16\" needle Use twice per day.    spironolacton-hydrochlorothiaz (Aldactazide) 25-25 mg tablet 25 mg, oral, Daily    traMADol (ULTRAM) 50 mg, oral, Every 8 hours    Trulicity 0.75 mg, subcutaneous, Every 7 days    Unilet Lancet 33 gauge misc use to test blood sugar once daily      Allergies:   Allergies   Allergen Reactions    Actos [Pioglitazone] Other     Chest pain/tightness    Doxycycline Angioedema    Liraglutide Nausea/vomiting    Lisinopril Angioedema    Cefdinir Headache and Nausea/vomiting    Pseudoephedrine Headache and Other       Past Medical & Surgical History:  Past Medical History:   Diagnosis Date    Anemia     Peptic ulcer     History reviewed. No pertinent surgical history.    No family history on file.  Social History     Socioeconomic History    Marital status:      Spouse name: Not on file    Number of children: Not on file    Years of education: Not on file    Highest education level: Not on file   Occupational History    Not on file   Tobacco Use    Smoking status: Never     Passive exposure: Never    Smokeless tobacco: Never   Substance and Sexual Activity    Alcohol use: Never    Drug use: Never    Sexual activity: Not on file   Other Topics Concern    Not on file   Social History Narrative    Not on file     Social Drivers of Health     Financial Resource Strain: Low Risk  (10/10/2024)    Overall Financial Resource Strain (CARDIA)     Difficulty of Paying Living Expenses: Not very hard   Food Insecurity: No Food Insecurity (10/29/2024)    Received from [facility]    Hunger Vital Sign     Worried About " "Running Out of Food in the Last Year: Never true     Ran Out of Food in the Last Year: Never true   Transportation Needs: No Transportation Needs (10/29/2024)    Received from [facility]    PRAPARE - Transportation     Lack of Transportation (Medical): No     Lack of Transportation (Non-Medical): No   Physical Activity: Not on file   Stress: No Stress Concern Present (4/3/2025)    [employer] - Occupational Stress Questionnaire     Feeling of Stress : Not at all   Social Connections: Not on file   Intimate Partner Violence: Not on file   Housing Stability: Unknown (10/29/2024)    Received from [facility]    Housing Stability Vital Sign     Unable to Pay for Housing in the Last Year: No     Number of Times Moved in the Last Year: Not on file     Homeless in the Last Year: No       Problems, Past medical history, past surgical history, Medications, allergies, social and family history reviewed and as per the electronic medical record from today's encounter    Review of Systems:  CONST: No fever, chills, fatigue, weight changes  EYES: No loss of vision  ENT: No hearing loss, tinnitus  CV: No chest pain, palpitations  RESP: No dyspnea, shortness of breath, cough  GI: No stool incontinence, nausea, vomiting  : No urinary incontinence  MSK: No joint swelling  SKIN: No rash, no hives  NEURO: No headache, dizziness  PSYCH: No anxiety, depression  HEM/LYMPH: No easy bruising or bleeding  All other systems reviewed are negative     Physical Exam:  Vitals: /80   Pulse 88   Resp 18   Ht 1.499 m (4' 11\")   Wt 71.7 kg (158 lb)   SpO2 98%   BMI 31.91 kg/m²   General: No apparent distress. Alert, appropriate, oriented x 3. Mood and affect normal. Speaking in full sentences.  HENT: Normocephalic, atraumatic. Hearing intact.  Eyes: Extraocular movements grossly intact. Pupils equal and round.   Neck: Supple, trachea midline.  Lungs: Symmetric respiratory excursion on visual exam, " "nonlabored breathing.  Extremities: No clubbing, cyanosis, or edema noted in arms or legs.  Skin: No rashes, lesions, alopecia noted on back or extremities.   MSK: Reports left groin pain with left hip scour maneuver. No pain with hip FADIR maneuver but does have some left buttock pain with hip flexion and external rotation  Back: Reports pain with extension, flexion. Reports pain to palpation overlying left lumbar paraspinal muscles. Reports pain to palpation overlying bilateral SI joints (L>R). Reports pain to palpation overlying left GTB, no tenderness overlying right GTB. Straight leg raise test positive on the left. No spasm noted over musculature. No misalignment or asymmetry noted.  Neuro: Alert and appropriate. Motor strength 5/5 throughout bilateral lower extremities. Sensory intact to light touch bilateral lower extremities. Gait within normal limits. Bulk and tone within normal limits.    Laboratory Data:  The following laboratory data were reviewed during this visit:   Lab Results   Component Value Date    WBC 9.8 04/03/2025    RBC 4.99 04/03/2025    HGB 13.8 04/03/2025    HCT 43.0 04/03/2025     04/03/2025      No results found for: \"INR\"  Lab Results   Component Value Date    CREATININE 1.43 (H) 04/03/2025    HGBA1C 6.3 (H) 04/03/2025       Imaging:  The following imaging impressions were reviewed by me during this visit:    -No results found for this or any previous visit from the past 180 days.       I also personally reviewed the images from the above studies myself. These images and my interpretation of them contributed to the management and decision making of the patient's medical plan.    ASSESSMENT:  Ms. Caty Lal is a 59 y.o. female with left low back, leg, and groin pain that is consistent with:    1. Spinal stenosis of lumbar region, unspecified whether neurogenic claudication present    2. Spondylolisthesis of lumbar region    3. Greater trochanteric bursitis of left hip    4. " Left groin pain    5. Myofascial pain    6. Long-term current use of opiate analgesic        PLAN:    Diagnostics:   - There is no recent dedicated imaging of the lumbar spine or left hip.  I did review her CT of the abdomen/pelvis from 2023 which does show significant degenerative disc disease and spondylolisthesis at L4/5.  I recommend a lumbar spine and left hip x-ray.     Physical Therapy and Rehabilitation:     - Referral for physical therapy provided today    Psychologically:  - No needs at this time    Medications  - Recommend trial of Duloxetine 30 mg daily.  Education on this medication provided today.  Side effects reviewed.  - We had a long discussion about pain medication options today.  I discussed that I generally do not recommend chronic opioid therapy for nonmalignant pain.  She is getting relief from Tramadol with no side effects.  Given the relatively low dose of this medication I believe the continuation is reasonable for now, but will attempt to taper down this medication while we pursue alternate treatments.  A controlled substance agreement was signed today and a urine toxicology was collected.  If appropriate we will continue Tramadol 50 mg q8h PRN    Duration  - 2-3 years    Interventions:  - The etiology of her left low back, leg and groin pain is unclear but may represent lumbar radiculopathy, lumbar spinal stenosis, and less likely intra-articular hip pathology.  While she does have significant groin pain, and pain with some hip provocative maneuvers today, she is not having pain with ambulation which is atypical for intra-articular hip pathology.  Will continue conservative care for now but may consider intervention in the future.        Sincerely,  Ike Starkey MD  [facility] [place]

## 2025-05-02 LAB
1OH-MIDAZOLAM UR-MCNC: NEGATIVE NG/ML
7AMINOCLONAZEPAM UR-MCNC: NEGATIVE NG/ML
A-OH ALPRAZ UR-MCNC: NEGATIVE NG/ML
A-OH-TRIAZOLAM UR-MCNC: NEGATIVE NG/ML
AMPHETAMINES UR QL: NEGATIVE NG/ML
BARBITURATES UR QL: NEGATIVE NG/ML
BZE UR QL: NEGATIVE NG/ML
CODEINE UR-MCNC: NEGATIVE NG/ML
CREAT UR-MCNC: 49.5 MG/DL
DRUG SCREEN COMMENT UR-IMP: ABNORMAL
EDDP UR-MCNC: NEGATIVE NG/ML
FENTANYL UR-MCNC: NEGATIVE NG/ML
HYDROCODONE UR-MCNC: NEGATIVE NG/ML
HYDROMORPHONE UR-MCNC: NEGATIVE NG/ML
LORAZEPAM UR-MCNC: NEGATIVE NG/ML
METHADONE UR-MCNC: NEGATIVE NG/ML
MORPHINE UR-MCNC: NEGATIVE NG/ML
NORDIAZEPAM UR-MCNC: NEGATIVE NG/ML
NORFENTANYL UR-MCNC: NEGATIVE NG/ML
NORHYDROCODONE UR CFM-MCNC: NEGATIVE NG/ML
NOROXYCODONE UR CFM-MCNC: NEGATIVE NG/ML
NORTRAMADOL UR-MCNC: 1138 NG/ML
OH-ETHYLFLURAZ UR-MCNC: NEGATIVE NG/ML
OXAZEPAM UR-MCNC: NEGATIVE NG/ML
OXIDANTS UR QL: NEGATIVE MCG/ML
OXYCODONE UR CFM-MCNC: NEGATIVE NG/ML
OXYMORPHONE UR CFM-MCNC: NEGATIVE NG/ML
PCP UR QL: NEGATIVE NG/ML
PH UR: 7.7 [PH] (ref 4.5–9)
QUEST 6 ACETYLMORPHINE: NEGATIVE NG/ML
QUEST NOTES AND COMMENTS: ABNORMAL
QUEST ZOLPIDEM: NEGATIVE NG/ML
TEMAZEPAM UR-MCNC: NEGATIVE NG/ML
THC UR QL: NEGATIVE NG/ML
TRAMADOL UR-MCNC: 2743 NG/ML
ZOLPIDEM PHENYL-4-CARB UR CFM-MCNC: NEGATIVE NG/ML

## 2025-05-05 NOTE — PROGRESS NOTES
Caty Lal presents to the office for her CGM personal start education and training. She is using Freestyle Rajiv 3. She is obtaining her supplies from pharmacy.     Referring Provider: Dr. Bryant Aguilar    Lab Results   Component Value Date    HGBA1C 6.3 (H) 04/03/2025     Lab Results   Component Value Date    GLUCOSE 115 (H) 04/03/2025    CREATININE 1.43 (H) 04/03/2025    EGFR 42 (L) 04/03/2025     Lab Results   Component Value Date    ALBUR 12 07/25/2023    CREATININE 1.43 (H) 04/03/2025     Lab Results   Component Value Date    CHOL 251 (H) 07/25/2023     Lab Results   Component Value Date    HDL 51 07/25/2023     Lab Results   Component Value Date    LDLCALC 153 (H) 07/25/2023     Lab Results   Component Value Date    TRIG 234 (H) 07/25/2023       CURRENT PHARMACOTHERAPY  Medications Ordered Prior to Encounter[1]    Allergies[2]    Current DM medications:  Lantus 20 units AM and 16 units PM  - did not adjust as discussed previously during televisit  Metformin 1000mg BID    Trulicity 0.75mg once weekly - has not started yet  - plans to start this Sunday  SMBG:  Testing 1-2 x/day  Reports readings being in low 100s up to upper 200s  CGM: Rajiv 3 personal start completed today in office  Using smartphone sylwia as reader  Alarms adjusted and patient connected to practice in Libreview  Encouraged to review weekly time in target and GMI for glycemic improvement  Hypoglycemia tx/sx/prevention: previous issues with hypoglycemia, none recently  Reviewed sx and treatment  Advised to carry glucose source at all times    Patient received the following instructions for Rajiv personal start: Sensor application and start up of sensor; aware to change in 15 days. Products for better adhesion; skin tac and simpatch. Expected differences in sensor glucose and blood glucose; always check with meter if symptoms do not match sensor glucose of if magnify glass appears on display. Trend arrows. [place] functions: target ranges  "set to ; alerts set if applicable. Creating a [employer] account if using reader and connecting to practice. Bring reader to all office visits, download reader 1 week before all office visits. Remove for MRI, CAT scan and X-ray. Call [facility] for problems with sensor, they will replace. Patient correctly applied sensor to back of upper arm and sensor session started.  Handouts: Rajiv 3 Plus Getting Started booklet  Freestyle Rajiv 3 personal start education and training provided by RAFFY Mackey, PharmD, BCPS    Advised to decrease Lantus to 20 units once daily once Trulicity started       [1]   Current Outpatient Medications on File Prior to Visit   Medication Sig Dispense Refill    amLODIPine (Norvasc) 10 mg tablet Take 1 tablet (10 mg) by mouth once daily. 90 tablet 0    blood sugar diagnostic (True Metrix Glucose Test Strip) strip 1 strip once daily. 100 strip 3    blood-glucose sensor (FreeStyle Rajiv 3 Plus Sensor) device Use to monitor glucose, change every 15 days 2 each 11    DULoxetine (Cymbalta) 30 mg DR capsule Take 1 capsule (30 mg) by mouth once daily. Do not crush or chew. 30 capsule 1    FreeStyle Lite Strips strip test AS DIRECTED TWICE DAILY 100 strip 11    insulin glargine (Lantus) 100 unit/mL (3 mL) pen Inject 20 Units under the skin once daily in the morning. Take as directed per insulin instructions. 18 mL 0    losartan (Cozaar) 50 mg tablet Take 1 tablet (50 mg) by mouth once daily. 90 tablet 0    metFORMIN (Glucophage) 1,000 mg tablet Take 1 tablet (1,000 mg) by mouth 2 times daily (morning and late afternoon). 180 tablet 3    omeprazole (PriLOSEC) 40 mg DR capsule Take 1 capsule (40 mg) by mouth 2 times a day. 180 capsule 0    ondansetron ODT (Zofran-ODT) 8 mg disintegrating tablet Dissolve 1 tablet (8 mg) in the mouth every 8 hours if needed for nausea or vomiting.      pen needle, diabetic (Pen Needle) 31 gauge x 3/16\" needle Use twice per day. 100 each 5    " spironolacton-hydrochlorothiaz (Aldactazide) 25-25 mg tablet Take 1 tablet (25 mg) by mouth once daily. 90 tablet 3    traMADol (Ultram) 50 mg tablet Take 1 tablet (50 mg) by mouth every 8 hours. Do not fill before February 18, 2025. (Patient taking differently: Take 1 tablet (50 mg) by mouth 3 times a day as needed for moderate pain (4 - 6).) 90 tablet 2    Unilet Lancet 33 gauge misc use to test blood sugar once daily 100 each 0    dulaglutide (Trulicity) 0.75 mg/0.5 mL pen injector Inject 0.75 mg under the skin every 7 days. (Patient not taking: Reported on 5/6/2025) 2 mL 2     No current facility-administered medications on file prior to visit.   [2]   Allergies  Allergen Reactions    Actos [Pioglitazone] Other     Chest pain/tightness    Doxycycline Angioedema    Liraglutide Nausea/vomiting    Lisinopril Angioedema    Cefdinir Headache and Nausea/vomiting    Pseudoephedrine Headache and Other

## 2025-05-06 ENCOUNTER — CLINICAL SUPPORT (OUTPATIENT)
Dept: PRIMARY CARE | Facility: CLINIC | Age: 60
End: 2025-05-06
Payer: COMMERCIAL

## 2025-05-06 DIAGNOSIS — E11.9 TYPE 2 DIABETES MELLITUS WITHOUT COMPLICATIONS: ICD-10-CM

## 2025-05-06 NOTE — Clinical Note
Dr. Lauren Byrne came in for an office visit for training on her Rajiv 3 CGM. We placed the first sensor and will be having a follow up call in 3 weeks. She has not started the Trulicity yet but plans to this Sunday. Let me know if any questions.

## 2025-05-09 ENCOUNTER — TELEPHONE (OUTPATIENT)
Dept: PRIMARY CARE | Facility: CLINIC | Age: 60
End: 2025-05-09
Payer: COMMERCIAL

## 2025-05-09 NOTE — TELEPHONE ENCOUNTER
Spoke with patient after reviewing Rajiv (started using this past week). Significant lows seen, did finger poke one time overnight when sensor reading 54 and finger stick was 90s, states she was laying on opposite arm. Reports only taking Lantus 20 units now and has dropped 16 units in evenings. Reports fingerstick today was only 10 points off, advised to continue to monitor accuracy of sensor in case of sensor failure. Also instructed to decrease Lantus to 15 units when Trulicity started to prevent hypoglycemia.

## 2025-05-16 ENCOUNTER — PHARMACY VISIT (OUTPATIENT)
Dept: PHARMACY | Facility: CLINIC | Age: 60
End: 2025-05-16
Payer: MEDICAID

## 2025-05-16 PROCEDURE — RXMED WILLOW AMBULATORY MEDICATION CHARGE

## 2025-05-27 ENCOUNTER — APPOINTMENT (OUTPATIENT)
Dept: PHARMACY | Facility: HOSPITAL | Age: 60
End: 2025-05-27
Payer: COMMERCIAL

## 2025-05-27 ENCOUNTER — APPOINTMENT (OUTPATIENT)
Dept: PAIN MEDICINE | Facility: CLINIC | Age: 60
End: 2025-05-27
Payer: COMMERCIAL

## 2025-06-03 ENCOUNTER — CLINICAL SUPPORT (OUTPATIENT)
Dept: PRIMARY CARE | Facility: CLINIC | Age: 60
End: 2025-06-03
Payer: COMMERCIAL

## 2025-06-03 ENCOUNTER — APPOINTMENT (OUTPATIENT)
Dept: PHARMACY | Facility: HOSPITAL | Age: 60
End: 2025-06-03
Payer: COMMERCIAL

## 2025-06-03 DIAGNOSIS — E11.9 TYPE 2 DIABETES MELLITUS WITHOUT COMPLICATIONS: ICD-10-CM

## 2025-06-03 RX ORDER — TRAMADOL HYDROCHLORIDE 50 MG/1
50 TABLET, FILM COATED ORAL EVERY 8 HOURS PRN
COMMUNITY

## 2025-06-03 NOTE — PROGRESS NOTES
Clinical Pharmacy Appointment    Patient ID: Caty Lal is a 60 y.o. female who presents for No chief complaint on file..    Pt is here for Follow Up appointment.     Referring Provider: Bryant Aguilar DO  PCP: Bryant Aguilar DO   Last visit with PCP: 04/02/25   Next visit with PCP: 07/08/25    Subjective   Medication Reconciliation:  Changed: n/a  Added: tramadol  Discontinued: n/a     Drug Interactions  No relevant drug interactions were noted.    Medication System Management  Patient's preferred pharmacy: Currently [facility]/  [place]  Adherence/Organization: no issues  Affordability/Accessibility: no concerns    Interval History  Started Trulicity 2 weeks ago, takes on Sundays  Stopped Lantus ~1 week ago    HPI    DIABETES MELLITUS TYPE 2:    Diagnosed with diabetes:  20 years ago.  Does patient follow with Endocrinology: Not at this time  Last optometry exam: 12/2024  Patient denies sores or cuts on feet today      Current diabetic medications include:  Trulicity 0.75mg once weekly  Metformin 1000mg BID  Stopped Lantus 16 units 1 week ago     Clarifications to above regimen: n/a  Adverse Effects: some nausea overnight     Past diabetic medications include:  Victoza - stomach issues  Byetta  Actos  Glipizide     Glucose Readings:  Glucometer/CGM Type: Rajiv 3     CGM Type: Freestyle Rajiv 3    CGM wear time 14 days   % Active 96%   Avg Glucose 127    GMI 6.3%   Variability 35.8%   TAR 12%   TIR 82%   TBR 6%   Reports reviewed with patient  See scanned documents    Any episodes of hypoglycemia? Yes, 73 while taking insulin with GLP (confirmed with glucometer, 64)  - has had overnight low readings but no sx  - checked with glucometer and confirmed compression lows (reading 50-70s but glucometer 90-120s)   Did patient treat episode of hypoglycemia appropriately? Yes,    Does pt have proteinuria? needs assessed      Secondary Prevention:  Statin? No  ACE-I/ARB? Yes  Aspirin? No     Pertinent PMH  "Review:  PMH of Pancreatitis: Yes- reports this was how she was originally diagnosed with DM  No recent elevated amylase/lipase  Reviewed risk of reoccurence, no issues with this while on Byetta or Victoza  PMH of Retinopathy: No  PMH of MTC: No  Gall bladder out    Objective   [Allergies]    [Allergies]  Allergen Reactions    Actos [Pioglitazone] Other     Chest pain/tightness    Doxycycline Angioedema    Liraglutide Nausea/vomiting    Lisinopril Angioedema    Cefdinir Headache and Nausea/vomiting    Pseudoephedrine Headache and Other     Social History     Social History Narrative    Not on file      Medication Review  Current Outpatient Medications   Medication Instructions    amLODIPine (NORVASC) 10 mg, oral, Daily    blood sugar diagnostic (True Metrix Glucose Test Strip) strip 1 strip, miscellaneous, Daily    blood-glucose sensor (FreeStyle Rajiv 3 Plus Sensor) device Use to monitor glucose, change every 15 days    DULoxetine (CYMBALTA) 30 mg, oral, Daily, Do not crush or chew.    FreeStyle Lite Strips strip test AS DIRECTED TWICE DAILY    insulin glargine (LANTUS) 20 Units, subcutaneous, Every morning, Take as directed per insulin instructions.    losartan (COZAAR) 50 mg, oral, Daily    metFORMIN (GLUCOPHAGE) 1,000 mg, oral, 2 times daily (morning and late afternoon)    omeprazole (PRILOSEC) 40 mg, oral, 2 times daily    ondansetron ODT (ZOFRAN-ODT) 8 mg, Every 8 hours PRN    pen needle, diabetic (Pen Needle) 31 gauge x 3/16\" needle Use twice per day.    spironolacton-hydrochlorothiaz (Aldactazide) 25-25 mg tablet 25 mg, oral, Daily    Trulicity 0.75 mg, subcutaneous, Every 7 days    Unilet Lancet 33 gauge misc use to test blood sugar once daily      Vitals  BP Readings from Last 2 Encounters:   04/30/25 136/80   04/03/25 127/77     BMI Readings from Last 1 Encounters:   04/30/25 31.91 kg/m²      Labs  A1C  Lab Results   Component Value Date    HGBA1C 6.3 (H) 04/03/2025    HGBA1C 6.1 (H) 10/30/2024    HGBA1C " 5.8 (H) 09/25/2024     BMP  Lab Results   Component Value Date    CALCIUM 10.7 (H) 04/03/2025     (L) 04/03/2025    K 4.7 04/03/2025    CO2 23 04/03/2025    CL 96 (L) 04/03/2025    BUN 30 (H) 04/03/2025    CREATININE 1.43 (H) 04/03/2025    EGFR 42 (L) 04/03/2025     LFTs  Lab Results   Component Value Date    ALT 38 04/03/2025    AST 43 (H) 04/03/2025    ALKPHOS 63 04/03/2025    BILITOT 0.4 04/03/2025     FLP  Lab Results   Component Value Date    TRIG 234 (H) 07/25/2023    CHOL 251 (H) 07/25/2023    LDLCALC 153 (H) 07/25/2023    HDL 51 07/25/2023     Urine Microalbumin  Lab Results   Component Value Date    MICROALBCREA 30.9 (H) 07/25/2023     Weight Management  Wt Readings from Last 3 Encounters:   04/30/25 71.7 kg (158 lb)   04/02/25 71.7 kg (158 lb)   03/05/25 72.6 kg (160 lb 0.9 oz)      There is no height or weight on file to calculate BMI.     Patient's goal A1c is < 7%.  Is pt at goal? Yes, 6.3%  Patient's SMBGs are at goal, low readings from compression lows and basal insulin stopped to prevent further issues.     Continue current GLP1 dose - only 2 doses so far and will recheck A1c in July  Continue off basal insulin to prevent hypoglycemia    Medication Changes:  CONTINUE  Trulicity 0.75mg once weekly  Metformin 1000mg BID  STOP  Lantus    Future Considerations:  GLP1 titration  Stop metformin    Assessment/Plan   Problem List Items Addressed This Visit    None      Clinical Pharmacist follow-up: 6-8 weeks, Telehealth visit  Patient is not followed in CCM. (If yes, track minutes under [facility] at each visit)    Continue all meds under the continuation of care with the referring provider and clinical pharmacy team.    Verbal consent to manage patient's drug therapy was obtained from the patient. They were informed they may decline to participate or withdraw from participation in pharmacy services at any time.

## 2025-06-03 NOTE — Clinical Note
Dr. Lauren Marinelli I had a follow up with Caty today about her diabetes. She delayed starting her Trulicity but has now taken two doses and has even stopped her basal insulin due to the improvement in readings/lows with both. She has an apt with you in July, please let me know if any questions!

## 2025-06-09 NOTE — PROGRESS NOTES
"[facility]  Follow Up Office Visit Note 6/10/2025    Patient Information: Caty Lal, MRN: [id number], : 1965   Primary Care/Referring Physician: Bryant Aguilar DO, [address] / [place] [address]     Chief Complaint: Left low back, leg, and groin pain    Interval History: Ms. Lal presents today for follow up. At her last visit I started Duloxetine, ordered her Tramadol, referred her for PT and ordered a lumbar spine/hip xray    Today she reports ***    Brief History of Pain: Ms. Caty Lal is a 60 y.o. female with a PMHx of HTN, T2DM, CKD who presents for low back and left buttock/groin pain.    She reports onset of pain around 2-3 years ago. She started working as a  and feels this contributed to her pain, but denies any obvious injury. She describes pain starting in her left low back/buttock, which radiates around towards her left groin. At night she feels a lot of \"pressure\" in her left groin area. She reports worsening pain with sitting, and when getting  up from a seated position. She feels very stiff and it takes her a while to straighten her back after getting up from a seated position. When she is up and walking her pain gradually improves. She reports having a \"torn disc\" in her low back in the past but the details of this are unclear. She denies any constant numbness, tingling or weakness in her legs.    Current Pain Medications: Tramadol 50 mg q8h PRN  Previously Tried Pain Medications: OTC Aleve - stopped after GI ulcer and CKD, Tylenol - stopped due to elevated LFT's, Soma    Relevant Surgeries: Denies lumbar spine or hip surgery  Injections: Denies  Physical/Occupational Therapy:  Denies any recent PT    Medications:   Current Outpatient Medications   Medication Instructions    amLODIPine (NORVASC) 10 mg, oral, Daily    blood sugar diagnostic (True Metrix Glucose Test Strip) strip 1 strip, miscellaneous, Daily    blood-glucose sensor (FreeStyle " "Rajiv 3 Plus Sensor) device Use to monitor glucose, change every 15 days    DULoxetine (CYMBALTA) 30 mg, oral, Daily, Do not crush or chew.    FreeStyle Lite Strips strip test AS DIRECTED TWICE DAILY    losartan (COZAAR) 50 mg, oral, Daily    metFORMIN (GLUCOPHAGE) 1,000 mg, oral, 2 times daily (morning and late afternoon)    omeprazole (PRILOSEC) 40 mg, oral, 2 times daily    ondansetron ODT (ZOFRAN-ODT) 8 mg, Every 8 hours PRN    pen needle, diabetic (Pen Needle) 31 gauge x 3/16\" needle Use twice per day.    spironolacton-hydrochlorothiaz (Aldactazide) 25-25 mg tablet 25 mg, oral, Daily    traMADol (ULTRAM) 50 mg, Every 8 hours PRN    Trulicity 0.75 mg, subcutaneous, Every 7 days    Unilet Lancet 33 gauge misc use to test blood sugar once daily      Allergies:   Allergies   Allergen Reactions    Actos [Pioglitazone] Other     Chest pain/tightness    Doxycycline Angioedema    Liraglutide Nausea/vomiting    Lisinopril Angioedema    Cefdinir Headache and Nausea/vomiting    Pseudoephedrine Headache and Other       Past Medical & Surgical History:  Past Medical History:   Diagnosis Date    Anemia     Peptic ulcer     No past surgical history on file.    No family history on file.  Social History     Socioeconomic History    Marital status:      Spouse name: Not on file    Number of children: Not on file    Years of education: Not on file    Highest education level: Not on file   Occupational History    Not on file   Tobacco Use    Smoking status: Never     Passive exposure: Never    Smokeless tobacco: Never   Substance and Sexual Activity    Alcohol use: Never    Drug use: Never    Sexual activity: Not on file   Other Topics Concern    Not on file   Social History Narrative    Not on file     Social Drivers of Health     Financial Resource Strain: Low Risk  (10/10/2024)    Overall Financial Resource Strain (CARDIA)     Difficulty of Paying Living Expenses: Not very hard   Food Insecurity: No Food Insecurity " (10/29/2024)    Received from [facility]    Hunger Vital Sign     Worried About Running Out of Food in the Last Year: Never true     Ran Out of Food in the Last Year: Never true   Transportation Needs: No Transportation Needs (10/29/2024)    Received from [facility]    PRAPARE - Transportation     Lack of Transportation (Medical): No     Lack of Transportation (Non-Medical): No   Physical Activity: Not on file   Stress: No Stress Concern Present (4/3/2025)    [employer] [place] [employer] - Occupational Stress Questionnaire     Feeling of Stress : Not at all   Social Connections: Not on file   Intimate Partner Violence: Not on file   Housing Stability: Unknown (10/29/2024)    Received from [facility]    Housing Stability Vital Sign     Unable to Pay for Housing in the Last Year: No     Number of Times Moved in the Last Year: Not on file     Homeless in the Last Year: No       Problems, Past medical history, past surgical history, Medications, allergies, social and family history reviewed and as per the electronic medical record from today's encounter    Review of Systems:  CONST: No fever, chills, fatigue, weight changes  EYES: No loss of vision  ENT: No hearing loss, tinnitus  CV: No chest pain, palpitations  RESP: No dyspnea, shortness of breath, cough  GI: No stool incontinence, nausea, vomiting  : No urinary incontinence  MSK: No joint swelling  SKIN: No rash, no hives  NEURO: No headache, dizziness  PSYCH: No anxiety, depression  HEM/LYMPH: No easy bruising or bleeding  All other systems reviewed are negative     Physical Exam:  Vitals: There were no vitals taken for this visit.  General: No apparent distress. Alert, appropriate, oriented x 3. Mood and affect normal. Speaking in full sentences.  HENT: Normocephalic, atraumatic. Hearing intact.  Eyes: Extraocular movements grossly intact. Pupils equal and round.   Neck: Supple, trachea midline.  Lungs: Symmetric respiratory  "excursion on visual exam, nonlabored breathing.  Extremities: No clubbing, cyanosis, or edema noted in arms or legs.  Skin: No rashes, lesions, alopecia noted on back or extremities.   MSK: Reports left groin pain with left hip scour maneuver. No pain with hip FADIR maneuver but does have some left buttock pain with hip flexion and external rotation  Back: Reports pain with extension, flexion. Reports pain to palpation overlying left lumbar paraspinal muscles. Reports pain to palpation overlying bilateral SI joints (L>R). Reports pain to palpation overlying left GTB, no tenderness overlying right GTB. Straight leg raise test positive on the left. No spasm noted over musculature. No misalignment or asymmetry noted.  Neuro: Alert and appropriate. Motor strength 5/5 throughout bilateral lower extremities. Sensory intact to light touch bilateral lower extremities. Gait within normal limits. Bulk and tone within normal limits.    Laboratory Data:  The following laboratory data were reviewed during this visit:   Lab Results   Component Value Date    WBC 9.8 04/03/2025    RBC 4.99 04/03/2025    HGB 13.8 04/03/2025    HCT 43.0 04/03/2025     04/03/2025      No results found for: \"INR\"  Lab Results   Component Value Date    CREATININE 1.43 (H) 04/03/2025    HGBA1C 6.3 (H) 04/03/2025       Imaging:  The following imaging impressions were reviewed by me during this visit:    -No results found for this or any previous visit from the past 180 days.       I also personally reviewed the images from the above studies myself. These images and my interpretation of them contributed to the management and decision making of the patient's medical plan.    ASSESSMENT:  Ms. Caty Lal is a 60 y.o. female with left low back, leg, and groin pain that is consistent with:    No diagnosis found.      PLAN:    Diagnostics:   - There is no recent dedicated imaging of the lumbar spine or left hip.  I did review her CT of the " abdomen/pelvis from 2023 which does show significant degenerative disc disease and spondylolisthesis at L4/5.  I recommend a lumbar spine and left hip x-ray.     Physical Therapy and Rehabilitation:     - Referral for physical therapy provided today    Psychologically:  - No needs at this time    Medications  - Recommend trial of Duloxetine 30 mg daily.  Education on this medication provided today.  Side effects reviewed.  - We had a long discussion about pain medication options today.  I discussed that I generally do not recommend chronic opioid therapy for nonmalignant pain.  She is getting relief from Tramadol with no side effects.  Given the relatively low dose of this medication I believe the continuation is reasonable for now, but will attempt to taper down this medication while we pursue alternate treatments.  A controlled substance agreement was signed today and a urine toxicology was collected.  If appropriate we will continue Tramadol 50 mg q8h PRN    Duration  - 2-3 years    Interventions:  - The etiology of her left low back, leg and groin pain is unclear but may represent lumbar radiculopathy, lumbar spinal stenosis, and less likely intra-articular hip pathology.  While she does have significant groin pain, and pain with some hip provocative maneuvers today, she is not having pain with ambulation which is atypical for intra-articular hip pathology.  Will continue conservative care for now but may consider intervention in the future.        Sincerely,  Ike Starkey MD  [facility] - [place]

## 2025-06-10 ENCOUNTER — APPOINTMENT (OUTPATIENT)
Dept: PAIN MEDICINE | Facility: CLINIC | Age: 60
End: 2025-06-10
Payer: COMMERCIAL

## 2025-06-18 ENCOUNTER — PHARMACY VISIT (OUTPATIENT)
Dept: PHARMACY | Facility: CLINIC | Age: 60
End: 2025-06-18
Payer: MEDICAID

## 2025-06-18 PROCEDURE — RXMED WILLOW AMBULATORY MEDICATION CHARGE

## 2025-06-24 ENCOUNTER — OFFICE VISIT (OUTPATIENT)
Dept: PAIN MEDICINE | Facility: CLINIC | Age: 60
End: 2025-06-24
Payer: COMMERCIAL

## 2025-06-24 VITALS
HEIGHT: 59 IN | RESPIRATION RATE: 18 BRPM | DIASTOLIC BLOOD PRESSURE: 76 MMHG | WEIGHT: 158 LBS | BODY MASS INDEX: 31.85 KG/M2 | HEART RATE: 83 BPM | OXYGEN SATURATION: 97 % | SYSTOLIC BLOOD PRESSURE: 132 MMHG

## 2025-06-24 DIAGNOSIS — R10.32 LEFT GROIN PAIN: ICD-10-CM

## 2025-06-24 DIAGNOSIS — M48.061 SPINAL STENOSIS OF LUMBAR REGION, UNSPECIFIED WHETHER NEUROGENIC CLAUDICATION PRESENT: Primary | ICD-10-CM

## 2025-06-24 DIAGNOSIS — Z79.891 LONG-TERM CURRENT USE OF OPIATE ANALGESIC: ICD-10-CM

## 2025-06-24 DIAGNOSIS — M79.18 MYOFASCIAL PAIN: ICD-10-CM

## 2025-06-24 DIAGNOSIS — M70.62 GREATER TROCHANTERIC BURSITIS OF LEFT HIP: ICD-10-CM

## 2025-06-24 DIAGNOSIS — M43.16 SPONDYLOLISTHESIS OF LUMBAR REGION: ICD-10-CM

## 2025-06-24 DIAGNOSIS — I10 HTN (HYPERTENSION), BENIGN: ICD-10-CM

## 2025-06-24 PROCEDURE — 99214 OFFICE O/P EST MOD 30 MIN: CPT | Performed by: STUDENT IN AN ORGANIZED HEALTH CARE EDUCATION/TRAINING PROGRAM

## 2025-06-24 PROCEDURE — 3078F DIAST BP <80 MM HG: CPT | Performed by: STUDENT IN AN ORGANIZED HEALTH CARE EDUCATION/TRAINING PROGRAM

## 2025-06-24 PROCEDURE — 3008F BODY MASS INDEX DOCD: CPT | Performed by: STUDENT IN AN ORGANIZED HEALTH CARE EDUCATION/TRAINING PROGRAM

## 2025-06-24 PROCEDURE — 4010F ACE/ARB THERAPY RXD/TAKEN: CPT | Performed by: STUDENT IN AN ORGANIZED HEALTH CARE EDUCATION/TRAINING PROGRAM

## 2025-06-24 PROCEDURE — 3075F SYST BP GE 130 - 139MM HG: CPT | Performed by: STUDENT IN AN ORGANIZED HEALTH CARE EDUCATION/TRAINING PROGRAM

## 2025-06-24 PROCEDURE — 1036F TOBACCO NON-USER: CPT | Performed by: STUDENT IN AN ORGANIZED HEALTH CARE EDUCATION/TRAINING PROGRAM

## 2025-06-24 PROCEDURE — 3044F HG A1C LEVEL LT 7.0%: CPT | Performed by: STUDENT IN AN ORGANIZED HEALTH CARE EDUCATION/TRAINING PROGRAM

## 2025-06-24 RX ORDER — DULOXETIN HYDROCHLORIDE 60 MG/1
60 CAPSULE, DELAYED RELEASE ORAL DAILY
Qty: 90 CAPSULE | Refills: 1 | Status: SHIPPED | OUTPATIENT
Start: 2025-06-24 | End: 2025-12-21

## 2025-06-24 RX ORDER — LOSARTAN POTASSIUM 50 MG/1
50 TABLET ORAL DAILY
Qty: 90 TABLET | Refills: 0 | Status: SHIPPED | OUTPATIENT
Start: 2025-06-24 | End: 2025-09-22

## 2025-06-24 RX ORDER — TRAMADOL HYDROCHLORIDE 50 MG/1
50 TABLET, FILM COATED ORAL EVERY 8 HOURS PRN
Qty: 90 TABLET | Refills: 2 | Status: SHIPPED | OUTPATIENT
Start: 2025-07-10 | End: 2025-10-08

## 2025-06-24 ASSESSMENT — PAIN SCALES - GENERAL
PAINLEVEL_OUTOF10: 7
PAINLEVEL_OUTOF10: 7

## 2025-06-24 ASSESSMENT — PAIN - FUNCTIONAL ASSESSMENT: PAIN_FUNCTIONAL_ASSESSMENT: 0-10

## 2025-06-24 NOTE — PROGRESS NOTES
"[facility]  Follow Up Office Visit Note 2025    Patient Information: Caty Lal, MRN: [id number], : 1965   Primary Care/Referring Physician: Bryant Aguilar DO, [address] [facility] / [place] [address]     Chief Complaint: Left low back, leg, and groin pain    Interval History: Ms. Lal presents today for follow up. At her last visit I started Duloxetine, ordered her Tramadol, referred her for PT and ordered a lumbar spine/hip xray    Today she reports doing slightly worse since last seen. She has not been able to start PT due to issues with scheduling due to her work. She continues to have left low back/buttock pain which can radiate to her groin and down her left thigh. She has been tolerating the Duloxetine well but hasn't noticed much improvement in her pain.  I reviewed her lumbar spine and left hip x-ray.  Her left hip is fairly unremarkable but her lumbar spine x-ray does show fairly significant degenerative changes including degenerative disc disease at L4/5 and L5/S1, along with anterolisthesis of L4 on L5.    Brief History of Pain: Ms. Caty Lal is a 60 y.o. female with a PMHx of HTN, T2DM, CKD who presents for low back and left buttock/groin pain.    She reports onset of pain around 2-3 years ago. She started working as a  and feels this contributed to her pain, but denies any obvious injury. She describes pain starting in her left low back/buttock, which radiates around towards her left groin. At night she feels a lot of \"pressure\" in her left groin area. She reports worsening pain with sitting, and when getting  up from a seated position. She feels very stiff and it takes her a while to straighten her back after getting up from a seated position. When she is up and walking her pain gradually improves. She reports having a \"torn disc\" in her low back in the past but the details of this are unclear. She denies any constant numbness, tingling or weakness " "in her legs.    Current Pain Medications: Tramadol 50 mg q8h PRN. Duloxetine 30 mg daily  Previously Tried Pain Medications: OTC Aleve - stopped after GI ulcer and CKD, Tylenol - stopped due to elevated LFT's, Soma    Relevant Surgeries: Denies lumbar spine or hip surgery  Injections: Denies  Physical/Occupational Therapy:  Denies any recent PT    Medications:   Current Outpatient Medications   Medication Instructions    amLODIPine (NORVASC) 10 mg, oral, Daily    blood sugar diagnostic (True Metrix Glucose Test Strip) strip 1 strip, miscellaneous, Daily    blood-glucose sensor (FreeStyle Rajiv 3 Plus Sensor) device Use to monitor glucose, change every 15 days    DULoxetine (CYMBALTA) 60 mg, oral, Daily, Do not crush or chew.    FreeStyle Lite Strips strip test AS DIRECTED TWICE DAILY    losartan (COZAAR) 50 mg, oral, Daily    metFORMIN (GLUCOPHAGE) 1,000 mg, oral, 2 times daily (morning and late afternoon)    omeprazole (PRILOSEC) 40 mg, oral, 2 times daily    ondansetron ODT (ZOFRAN-ODT) 8 mg, Every 8 hours PRN    pen needle, diabetic (Pen Needle) 31 gauge x 3/16\" needle Use twice per day.    spironolacton-hydrochlorothiaz (Aldactazide) 25-25 mg tablet 25 mg, oral, Daily    [START ON 7/10/2025] traMADol (ULTRAM) 50 mg, oral, Every 8 hours PRN    Trulicity 0.75 mg, subcutaneous, Every 7 days    Unilet Lancet 33 gauge misc use to test blood sugar once daily      Allergies:   Allergies   Allergen Reactions    Actos [Pioglitazone] Other     Chest pain/tightness    Doxycycline Angioedema    Liraglutide Nausea/vomiting    Lisinopril Angioedema    Cefdinir Headache and Nausea/vomiting    Pseudoephedrine Headache and Other       Past Medical & Surgical History:  Past Medical History:   Diagnosis Date    Anemia     Peptic ulcer     History reviewed. No pertinent surgical history.    No family history on file.  Social History     Socioeconomic History    Marital status:      Spouse name: Not on file    Number of " children: Not on file    Years of education: Not on file    Highest education level: Not on file   Occupational History    Not on file   Tobacco Use    Smoking status: Never     Passive exposure: Never    Smokeless tobacco: Never   Substance and Sexual Activity    Alcohol use: Never    Drug use: Never    Sexual activity: Not on file   Other Topics Concern    Not on file   Social History Narrative    Not on file     Social Drivers of Health     Financial Resource Strain: Low Risk  (10/10/2024)    Overall Financial Resource Strain (CARDIA)     Difficulty of Paying Living Expenses: Not very hard   Food Insecurity: No Food Insecurity (10/29/2024)    Received from [facility]    Hunger Vital Sign     Worried About Running Out of Food in the Last Year: Never true     Ran Out of Food in the Last Year: Never true   Transportation Needs: No Transportation Needs (10/29/2024)    Received from [facility]    PRAPARE - Transportation     Lack of Transportation (Medical): No     Lack of Transportation (Non-Medical): No   Physical Activity: Not on file   Stress: No Stress Concern Present (4/3/2025)    [employer] [place] [employer] - Occupational Stress Questionnaire     Feeling of Stress : Not at all   Social Connections: Not on file   Intimate Partner Violence: Not on file   Housing Stability: Unknown (10/29/2024)    Received from [facility]    Housing Stability Vital Sign     Unable to Pay for Housing in the Last Year: No     Number of Times Moved in the Last Year: Not on file     Homeless in the Last Year: No       Problems, Past medical history, past surgical history, Medications, allergies, social and family history reviewed and as per the electronic medical record from today's encounter    Review of Systems:  CONST: No fever, chills, fatigue, weight changes  EYES: No loss of vision  ENT: No hearing loss, tinnitus  CV: No chest pain, palpitations  RESP: No dyspnea, shortness of breath, cough  GI: No  "stool incontinence, nausea, vomiting  : No urinary incontinence  MSK: No joint swelling  SKIN: No rash, no hives  NEURO: No headache, dizziness  PSYCH: No anxiety, depression  HEM/LYMPH: No easy bruising or bleeding  All other systems reviewed are negative     Physical Exam:  Vitals: /76   Pulse 83   Resp 18   Ht 1.499 m (4' 11\")   Wt 71.7 kg (158 lb)   SpO2 97%   BMI 31.91 kg/m²   General: No apparent distress. Alert, appropriate, oriented x 3. Mood and affect normal. Speaking in full sentences.  HENT: Normocephalic, atraumatic. Hearing intact.  Eyes: Extraocular movements grossly intact. Pupils equal and round.   Neck: Supple, trachea midline.  Lungs: Symmetric respiratory excursion on visual exam, nonlabored breathing.  Extremities: No clubbing, cyanosis, or edema noted in arms or legs.  Skin: No rashes, lesions, alopecia noted on back or extremities.   MSK: Reports left groin pain with left hip scour maneuver. No pain with hip FADIR maneuver but does have some left buttock pain with hip flexion and external rotation  Back: Reports pain with extension, flexion. Reports pain to palpation overlying left lumbar paraspinal muscles. Reports pain to palpation overlying bilateral SI joints (L>R). Reports pain to palpation overlying left GTB, no tenderness overlying right GTB. Straight leg raise test positive on the left. No spasm noted over musculature. No misalignment or asymmetry noted.  Neuro: Alert and appropriate. Motor strength 5/5 throughout bilateral lower extremities. Sensory intact to light touch bilateral lower extremities. Gait within normal limits. Bulk and tone within normal limits.    Laboratory Data:  The following laboratory data were reviewed during this visit:   Lab Results   Component Value Date    WBC 9.8 04/03/2025    RBC 4.99 04/03/2025    HGB 13.8 04/03/2025    HCT 43.0 04/03/2025     04/03/2025      No results found for: \"INR\"  Lab Results   Component Value Date    " CREATININE 1.43 (H) 04/03/2025    HGBA1C 6.3 (H) 04/03/2025       Imaging:  The following imaging impressions were reviewed by me during this visit:    -4/30/25 lumbar spine xray shows degenerative changes including disc space narrowing, endplate spurring, endplate sclerosis, facet hypertrophy, most conspicuous at the L4-L5 and L5-S1 levels. There is grade 1 spondylolisthesis of L4 on L5 which is most likely related to facet degenerative disease. Facet joints demonstrating normal alignment. There is no evidence of spondylolysis.  -4/30/25 left hip xray shows mild degenerative changes including joint space narrowing, spurring, subchondral sclerosis. Sacroiliac joints appear symmetric.     I also personally reviewed the images from the above studies myself. These images and my interpretation of them contributed to the management and decision making of the patient's medical plan.    ASSESSMENT:  Ms. Caty Lal is a 60 y.o. female with left low back, leg, and groin pain that is consistent with:    1. Spinal stenosis of lumbar region, unspecified whether neurogenic claudication present    2. Spondylolisthesis of lumbar region    3. Greater trochanteric bursitis of left hip    4. Left groin pain    5. Myofascial pain    6. Long-term current use of opiate analgesic          PLAN:    Diagnostics:   - I reviewed her recent lumbar spine and left hip x-ray (see above for details).  No additional diagnostics at this time but may consider lumbar spine MRI in the future    Physical Therapy and Rehabilitation:     - Referral for physical therapy provided previously.  I encouraged her to schedule this    Psychologically:  - No needs at this time    Medications  - Recommend increasing to Duloxetine 60 mg daily.    - We had a long discussion about pain medication options.  I discussed that I generally do not recommend chronic opioid therapy for nonmalignant pain.  She is getting relief from Tramadol with no side effects.  Given  the relatively low dose of this medication I believe the continuation is reasonable for now, but will attempt to taper down this medication while we pursue alternate treatments.  A controlled substance agreement was signed previously and a urine toxicology was appropriate. Refills for Tramadol provided today.     Duration  - 2-3 years    Interventions:  - The etiology of her left low back, leg and groin pain is unclear but may represent lumbar radiculopathy, lumbar spinal stenosis, and less likely intra-articular hip pathology.  While she does have significant groin pain, and pain with some hip provocative maneuvers today, she is not having pain with ambulation which is atypical for intra-articular hip pathology.  After review of her imaging I did recommend a left-sided transforaminal HEIDY at L4/5, L5/S1.  She would like to continue conservative management for now but may consider this in the future.        Sincerely,  Ike Starkey MD  [facility] - [place]

## 2025-06-25 ENCOUNTER — APPOINTMENT (OUTPATIENT)
Dept: PAIN MEDICINE | Facility: CLINIC | Age: 60
End: 2025-06-25
Payer: COMMERCIAL

## 2025-06-26 ENCOUNTER — LAB (OUTPATIENT)
Dept: LAB | Facility: HOSPITAL | Age: 60
End: 2025-06-26
Payer: COMMERCIAL

## 2025-06-26 DIAGNOSIS — D50.9 IRON DEFICIENCY ANEMIA, UNSPECIFIED: Primary | ICD-10-CM

## 2025-06-26 LAB
BASOPHILS # BLD AUTO: 0.05 X10*3/UL (ref 0–0.1)
BASOPHILS NFR BLD AUTO: 0.5 %
EOSINOPHIL # BLD AUTO: 0.09 X10*3/UL (ref 0–0.7)
EOSINOPHIL NFR BLD AUTO: 0.9 %
ERYTHROCYTE [DISTWIDTH] IN BLOOD BY AUTOMATED COUNT: 13.3 % (ref 11.5–14.5)
FERRITIN SERPL-MCNC: 531 NG/ML (ref 8–150)
FOLATE SERPL-MCNC: 13.8 NG/ML
HCT VFR BLD AUTO: 46.6 % (ref 36–46)
HGB BLD-MCNC: 14.4 G/DL (ref 12–16)
IMM GRANULOCYTES # BLD AUTO: 0.08 X10*3/UL (ref 0–0.7)
IMM GRANULOCYTES NFR BLD AUTO: 0.8 % (ref 0–0.9)
IRON SATN MFR SERPL: 20 % (ref 25–45)
IRON SERPL-MCNC: 77 UG/DL (ref 35–150)
LYMPHOCYTES # BLD AUTO: 3.85 X10*3/UL (ref 1.2–4.8)
LYMPHOCYTES NFR BLD AUTO: 36.9 %
MCH RBC QN AUTO: 28 PG (ref 26–34)
MCHC RBC AUTO-ENTMCNC: 30.9 G/DL (ref 32–36)
MCV RBC AUTO: 91 FL (ref 80–100)
MONOCYTES # BLD AUTO: 0.64 X10*3/UL (ref 0.1–1)
MONOCYTES NFR BLD AUTO: 6.1 %
NEUTROPHILS # BLD AUTO: 5.71 X10*3/UL (ref 1.2–7.7)
NEUTROPHILS NFR BLD AUTO: 54.8 %
NRBC BLD-RTO: 0 /100 WBCS (ref 0–0)
PLATELET # BLD AUTO: 365 X10*3/UL (ref 150–450)
RBC # BLD AUTO: 5.15 X10*6/UL (ref 4–5.2)
TIBC SERPL-MCNC: 389 UG/DL (ref 240–445)
UIBC SERPL-MCNC: 312 UG/DL (ref 110–370)
WBC # BLD AUTO: 10.4 X10*3/UL (ref 4.4–11.3)

## 2025-06-26 PROCEDURE — 85025 COMPLETE CBC W/AUTO DIFF WBC: CPT

## 2025-06-26 PROCEDURE — 83550 IRON BINDING TEST: CPT

## 2025-06-26 PROCEDURE — 36415 COLL VENOUS BLD VENIPUNCTURE: CPT

## 2025-06-26 PROCEDURE — 82728 ASSAY OF FERRITIN: CPT

## 2025-06-26 PROCEDURE — 82746 ASSAY OF FOLIC ACID SERUM: CPT

## 2025-06-26 PROCEDURE — 83540 ASSAY OF IRON: CPT

## 2025-07-08 ENCOUNTER — OFFICE VISIT (OUTPATIENT)
Dept: PRIMARY CARE | Facility: CLINIC | Age: 60
End: 2025-07-08
Payer: COMMERCIAL

## 2025-07-08 VITALS
SYSTOLIC BLOOD PRESSURE: 138 MMHG | BODY MASS INDEX: 29.43 KG/M2 | HEART RATE: 91 BPM | HEIGHT: 59 IN | DIASTOLIC BLOOD PRESSURE: 80 MMHG | OXYGEN SATURATION: 97 % | WEIGHT: 146 LBS

## 2025-07-08 DIAGNOSIS — I10 ESSENTIAL (PRIMARY) HYPERTENSION: ICD-10-CM

## 2025-07-08 DIAGNOSIS — E67.3 HYPERVITAMINOSIS D: Primary | ICD-10-CM

## 2025-07-08 DIAGNOSIS — K21.9 GASTROESOPHAGEAL REFLUX DISEASE WITHOUT ESOPHAGITIS: ICD-10-CM

## 2025-07-08 DIAGNOSIS — M70.62 GREATER TROCHANTERIC BURSITIS OF LEFT HIP: ICD-10-CM

## 2025-07-08 DIAGNOSIS — Z79.4 TYPE 2 DIABETES MELLITUS WITHOUT COMPLICATION, WITH LONG-TERM CURRENT USE OF INSULIN: ICD-10-CM

## 2025-07-08 DIAGNOSIS — E83.52 HYPERCALCEMIA: ICD-10-CM

## 2025-07-08 DIAGNOSIS — R30.9 URINARY PAIN: ICD-10-CM

## 2025-07-08 DIAGNOSIS — E11.9 TYPE 2 DIABETES MELLITUS WITHOUT COMPLICATION, WITH LONG-TERM CURRENT USE OF INSULIN: ICD-10-CM

## 2025-07-08 DIAGNOSIS — R10.32 LEFT GROIN PAIN: ICD-10-CM

## 2025-07-08 DIAGNOSIS — M26.621 ARTHRALGIA OF RIGHT TEMPOROMANDIBULAR JOINT: ICD-10-CM

## 2025-07-08 DIAGNOSIS — M43.16 SPONDYLOLISTHESIS OF LUMBAR REGION: ICD-10-CM

## 2025-07-08 DIAGNOSIS — M79.18 MYOFASCIAL PAIN: ICD-10-CM

## 2025-07-08 DIAGNOSIS — E78.2 MIXED HYPERLIPIDEMIA: ICD-10-CM

## 2025-07-08 DIAGNOSIS — N20.0 NEPHROLITHIASIS: ICD-10-CM

## 2025-07-08 DIAGNOSIS — M48.061 SPINAL STENOSIS OF LUMBAR REGION, UNSPECIFIED WHETHER NEUROGENIC CLAUDICATION PRESENT: ICD-10-CM

## 2025-07-08 LAB
POC APPEARANCE, URINE: CLEAR
POC BILIRUBIN, URINE: NEGATIVE
POC BLOOD, URINE: NEGATIVE
POC COLOR, URINE: YELLOW
POC GLUCOSE, URINE: NEGATIVE MG/DL
POC KETONES, URINE: NEGATIVE MG/DL
POC LEUKOCYTES, URINE: ABNORMAL
POC NITRITE,URINE: NEGATIVE
POC PH, URINE: 6 PH
POC PROTEIN, URINE: NEGATIVE MG/DL
POC SPECIFIC GRAVITY, URINE: 1.02
POC UROBILINOGEN, URINE: 0.2 EU/DL

## 2025-07-08 PROCEDURE — 3044F HG A1C LEVEL LT 7.0%: CPT | Performed by: FAMILY MEDICINE

## 2025-07-08 PROCEDURE — 1036F TOBACCO NON-USER: CPT | Performed by: FAMILY MEDICINE

## 2025-07-08 PROCEDURE — 3079F DIAST BP 80-89 MM HG: CPT | Performed by: FAMILY MEDICINE

## 2025-07-08 PROCEDURE — 99214 OFFICE O/P EST MOD 30 MIN: CPT | Performed by: FAMILY MEDICINE

## 2025-07-08 PROCEDURE — 3075F SYST BP GE 130 - 139MM HG: CPT | Performed by: FAMILY MEDICINE

## 2025-07-08 PROCEDURE — 3008F BODY MASS INDEX DOCD: CPT | Performed by: FAMILY MEDICINE

## 2025-07-08 PROCEDURE — 4010F ACE/ARB THERAPY RXD/TAKEN: CPT | Performed by: FAMILY MEDICINE

## 2025-07-08 PROCEDURE — 81003 URINALYSIS AUTO W/O SCOPE: CPT | Mod: QW | Performed by: FAMILY MEDICINE

## 2025-07-08 RX ORDER — SPIRONOLACTONE AND HYDROCHLOROTHIAZIDE 25; 25 MG/1; MG/1
1 TABLET ORAL DAILY
Qty: 90 TABLET | Refills: 3 | Status: SHIPPED | OUTPATIENT
Start: 2025-07-08 | End: 2026-07-08

## 2025-07-08 RX ORDER — PEN NEEDLE, DIABETIC 30 GX3/16"
NEEDLE, DISPOSABLE MISCELLANEOUS
Qty: 100 EACH | Refills: 5 | Status: SHIPPED | OUTPATIENT
Start: 2025-07-08

## 2025-07-08 RX ORDER — DULOXETIN HYDROCHLORIDE 60 MG/1
60 CAPSULE, DELAYED RELEASE ORAL DAILY
Qty: 90 CAPSULE | Refills: 3 | Status: SHIPPED | OUTPATIENT
Start: 2025-07-08

## 2025-07-08 RX ORDER — CYCLOBENZAPRINE HCL 10 MG
10 TABLET ORAL NIGHTLY PRN
Qty: 30 TABLET | Refills: 0 | Status: SHIPPED | OUTPATIENT
Start: 2025-07-08 | End: 2025-09-06

## 2025-07-08 RX ORDER — NITROFURANTOIN 25; 75 MG/1; MG/1
100 CAPSULE ORAL 2 TIMES DAILY
Qty: 10 CAPSULE | Refills: 0 | Status: SHIPPED | OUTPATIENT
Start: 2025-07-08 | End: 2025-07-13

## 2025-07-08 RX ORDER — BLOOD-GLUCOSE SENSOR
EACH MISCELLANEOUS
Qty: 2 EACH | Refills: 11 | Status: SHIPPED | OUTPATIENT
Start: 2025-07-08

## 2025-07-08 RX ORDER — INSULIN GLARGINE 100 [IU]/ML
20 INJECTION, SOLUTION SUBCUTANEOUS EVERY MORNING
COMMUNITY
Start: 2025-07-06

## 2025-07-08 RX ORDER — CALCIUM CITRATE/VITAMIN D3 200MG-6.25
1 TABLET ORAL DAILY
Qty: 100 STRIP | Refills: 3 | Status: SHIPPED | OUTPATIENT
Start: 2025-07-08 | End: 2026-07-08

## 2025-07-08 RX ORDER — LOSARTAN POTASSIUM 50 MG/1
50 TABLET ORAL DAILY
Qty: 90 TABLET | Refills: 3 | Status: SHIPPED | OUTPATIENT
Start: 2025-07-08

## 2025-07-08 RX ORDER — AMLODIPINE BESYLATE 10 MG/1
10 TABLET ORAL DAILY
Qty: 90 TABLET | Refills: 3 | Status: SHIPPED | OUTPATIENT
Start: 2025-07-08

## 2025-07-08 RX ORDER — DULAGLUTIDE 0.75 MG/.5ML
0.75 INJECTION, SOLUTION SUBCUTANEOUS
Qty: 2 ML | Refills: 2 | Status: SHIPPED | OUTPATIENT
Start: 2025-07-08

## 2025-07-08 RX ORDER — OMEPRAZOLE 40 MG/1
40 CAPSULE, DELAYED RELEASE ORAL 2 TIMES DAILY
Qty: 180 CAPSULE | Refills: 3 | Status: SHIPPED | OUTPATIENT
Start: 2025-07-08 | End: 2026-07-08

## 2025-07-08 RX ORDER — METFORMIN HYDROCHLORIDE 1000 MG/1
1000 TABLET ORAL
Qty: 180 TABLET | Refills: 3 | Status: SHIPPED | OUTPATIENT
Start: 2025-07-08 | End: 2026-07-08

## 2025-07-08 ASSESSMENT — ENCOUNTER SYMPTOMS
DEPRESSION: 1
OCCASIONAL FEELINGS OF UNSTEADINESS: 0
LOSS OF SENSATION IN FEET: 0

## 2025-07-08 ASSESSMENT — PAIN SCALES - GENERAL: PAINLEVEL_OUTOF10: 3

## 2025-07-08 NOTE — PROGRESS NOTES
60-year presents to the clinic for follow-up and new complaints    1. Hypervitaminosis D   Recently found have a vitamin D level elevated above 200 as well as hypercalcemia.  She was taking daily vitamin D supplementation has now switched to every couple of days.  Needs rechecked.   2. Essential (primary) hypertension    3. Altered metabolism due to diabetes (Multi)    4. Type 2 diabetes mellitus without complications   Currently working with pharmacy of increasing titrating dose of Trulicity and downtrending Lantus   5. Spinal stenosis of lumbar region, unspecified whether neurogenic claudication present    6. Spondylolisthesis of lumbar region    7. Greater trochanteric bursitis of left hip    8. Left groin pain    9. Myofascial pain    10. HTN (hypertension), benign   Blood pressure 138/80 on current therapy   11. Gastric ulcer, unspecified as acute or chronic, without hemorrhage or perforation    12. Urinary pain   Patient with a 3-day history of increased frequency of urination as well as flank pain on the left radiating around to her right side.  Denies fevers, chills, dysuria or hematuria.  Has had an episode of hydronephrosis in the past requiring stenting per patient   13. Hypercalcemia   Hypercalcemia workup done 1 year ago revealed an elevated calcium which had normalized as well as normal PTH.  Had mildly elevated calciums in the last couple of blood draws with underlying hypervitaminosis D   14. Arthralgia of right temporomandibular joint   Patient complains of pain in her right ear.  States she does clench her jaw at night and wakes up biting her cheek occasionally.     All pertinent positive symptoms are included in history of present illness.    All other systems have been reviewed and are negative and noncontributory to this patient's current ailments.    CONSTITUTIONAL - INAD. Not ill appearing.  SKIN - No lesions or rashes visualized. No jaundice visualized.  HEENT- Atraumatic, normocephalic, no  "scleral icterus, external nares are not erythematous and without drainage, no neck masses visualized, oropharynx visualized and is without erythema or exudate.  Right TM and EAC are clear without abnormalities.  No mastoid process tenderness  RESP - respiration not labored   CARDIAC - no grade 6 systolic murmurs auscultated  ABDOMEN - nondistended.  NEURO- CNs II-XII grossly intact  MUSCULOSKELETAL-tenderness to palpation overlying the right TMJ    1. Essential (primary) hypertension    - amLODIPine (Norvasc) 10 mg tablet; Take 1 tablet (10 mg) by mouth once daily.  Dispense: 90 tablet; Refill: 3    2. Altered metabolism due to diabetes (Multi)    - blood sugar diagnostic (True Metrix Glucose Test Strip); 1 strip once daily.  Dispense: 100 strip; Refill: 3  - pen needle, diabetic (Pen Needle) 31 gauge x 3/16\" needle; Use twice per day.  Dispense: 100 each; Refill: 5  - Hemoglobin A1c; Future  - Albumin-Creatinine Ratio, Urine Random; Future  - Hemoglobin A1c    3. Type 2 diabetes mellitus without complications  Well-controlled continue titrating Trulicity and decreasing insulin requirements  - blood-glucose sensor (FreeStyle Rajiv 3 Plus Sensor) device; Use to monitor glucose, change every 15 days  Dispense: 2 each; Refill: 11  - dulaglutide (Trulicity) 0.75 mg/0.5 mL pen injector; Inject 0.75 mg under the skin every 7 days.  Dispense: 2 mL; Refill: 2  - metFORMIN (Glucophage) 1,000 mg tablet; Take 1 tablet (1,000 mg) by mouth 2 times daily (morning and late afternoon).  Dispense: 180 tablet; Refill: 3  - Lipid panel; Future  - Hemoglobin A1c; Future  - Albumin-Creatinine Ratio, Urine Random; Future  - Lipid panel  - Hemoglobin A1c    4. Spinal stenosis of lumbar region, unspecified whether neurogenic claudication present    - DULoxetine (Cymbalta) 60 mg DR capsule; Take 1 capsule (60 mg) by mouth once daily. Do not crush or chew.  Dispense: 90 capsule; Refill: 3    5. Spondylolisthesis of lumbar region    - " DULoxetine (Cymbalta) 60 mg DR capsule; Take 1 capsule (60 mg) by mouth once daily. Do not crush or chew.  Dispense: 90 capsule; Refill: 3    6. Greater trochanteric bursitis of left hip    - DULoxetine (Cymbalta) 60 mg DR capsule; Take 1 capsule (60 mg) by mouth once daily. Do not crush or chew.  Dispense: 90 capsule; Refill: 3    7. Left groin pain    - DULoxetine (Cymbalta) 60 mg DR capsule; Take 1 capsule (60 mg) by mouth once daily. Do not crush or chew.  Dispense: 90 capsule; Refill: 3    8. Myofascial pain  Stable  - DULoxetine (Cymbalta) 60 mg DR capsule; Take 1 capsule (60 mg) by mouth once daily. Do not crush or chew.  Dispense: 90 capsule; Refill: 3    9. HTN (hypertension), benign  Close to being well-controlled continue medications, could increase losartan at future visit if still above 130  - losartan (Cozaar) 50 mg tablet; Take 1 tablet (50 mg) by mouth once daily.  Dispense: 90 tablet; Refill: 3  - spironolacton-hydrochlorothiaz (Aldactazide) 25-25 mg tablet; Take 1 tablet (25 mg) by mouth once daily.  Dispense: 90 tablet; Refill: 3    10. Gastric ulcer, unspecified as acute or chronic, without hemorrhage or perforation  Well-controlled continue omeprazole  - omeprazole (PriLOSEC) 40 mg DR capsule; Take 1 capsule (40 mg) by mouth 2 times a day.  Dispense: 180 capsule; Refill: 3    11. Urinary pain  Point-of-care urinalysis reveals small leukocytes no blood or nitrates or protein.  Patient with history of hydronephrosis will get ultrasound of the kidneys and bladder, pain could be radiating from the back versus hip as she does have orthopedic pathology on that left side.  Will treat empirically for UTI with Macrobid sent for culture  - POCT UA Automated manually resulted  - US renal complete; Future  - Comprehensive metabolic panel; Future  - nitrofurantoin, macrocrystal-monohydrate, (Macrobid) 100 mg capsule; Take 1 capsule (100 mg) by mouth 2 times a day for 5 days.  Dispense: 10 capsule; Refill:  0  - Comprehensive metabolic panel  - Urine Culture; Future    12. Hypervitaminosis D (Primary)  Recommend cessation of vitamin D supplement recheck vitamin D level follow-up with nephrology  - Vitamin D 25-Hydroxy,Total (for eval of Vitamin D levels); Future  - PTH, intact; Future  - Vitamin D 25-Hydroxy,Total (for eval of Vitamin D levels)  - PTH, intact    13. Hypercalcemia  Recheck calcium level and PTH recommend discontinuing vitamin D supplementation until vitamin D normalizes follow-up with nephrology  - Comprehensive metabolic panel; Future  - PTH, intact; Future  - Comprehensive metabolic panel  - PTH, intact    14. Arthralgia of right temporomandibular joint  The natural history and course of temporomandibular joint pain was discussed at length with the patient.  Discussed how TMJ pain can typically caused by many things and is usually multifactorial, however some possibilities discussed could include grinding teeth at night/jaw clenching, chewing hard candy or gum regularly, or overuse in general.  Discussed that the treatments for TMJ pain typically include conservative measures such as the following:  Having a mouthguard made by dentistry to wear at night  Discontinuing gum chewing or eating hard foods  Short courses of anti-inflammatories, steroids, or muscle relaxers to help with the acute pain  Possible referral in the future to a specialist to discuss joint injections and, very rarely, surgical consultation.  Occasionally, x-rays can be obtained of the jaw/TMJ to evaluate for arthritis or other abnormalities.  - cyclobenzaprine (Flexeril) 10 mg tablet; Take 1 tablet (10 mg) by mouth as needed at bedtime for muscle spasms.  Dispense: 30 tablet; Refill: 0

## 2025-07-09 ENCOUNTER — HOSPITAL ENCOUNTER (OUTPATIENT)
Dept: RADIOLOGY | Facility: CLINIC | Age: 60
Discharge: HOME | End: 2025-07-09
Payer: COMMERCIAL

## 2025-07-09 DIAGNOSIS — R30.9 URINARY PAIN: ICD-10-CM

## 2025-07-09 PROCEDURE — 76770 US EXAM ABDO BACK WALL COMP: CPT | Performed by: RADIOLOGY

## 2025-07-09 PROCEDURE — 76770 US EXAM ABDO BACK WALL COMP: CPT

## 2025-07-10 LAB
25(OH)D3+25(OH)D2 SERPL-MCNC: 125 NG/ML (ref 30–100)
ALBUMIN SERPL-MCNC: 4.9 G/DL (ref 3.6–5.1)
ALP SERPL-CCNC: 55 U/L (ref 37–153)
ALT SERPL-CCNC: 19 U/L (ref 6–29)
ANION GAP SERPL CALCULATED.4IONS-SCNC: 11 MMOL/L (CALC) (ref 7–17)
AST SERPL-CCNC: 17 U/L (ref 10–35)
BILIRUB SERPL-MCNC: 0.5 MG/DL (ref 0.2–1.2)
BUN SERPL-MCNC: 26 MG/DL (ref 7–25)
CALCIUM SERPL-MCNC: 10.6 MG/DL (ref 8.6–10.4)
CHLORIDE SERPL-SCNC: 96 MMOL/L (ref 98–110)
CHOLEST SERPL-MCNC: 244 MG/DL
CHOLEST/HDLC SERPL: 6.1 (CALC)
CO2 SERPL-SCNC: 30 MMOL/L (ref 20–32)
CREAT SERPL-MCNC: 1.1 MG/DL (ref 0.5–1.05)
EGFRCR SERPLBLD CKD-EPI 2021: 57 ML/MIN/1.73M2
EST. AVERAGE GLUCOSE BLD GHB EST-MCNC: 151 MG/DL
EST. AVERAGE GLUCOSE BLD GHB EST-SCNC: 8.4 MMOL/L
GLUCOSE SERPL-MCNC: 163 MG/DL (ref 65–99)
HBA1C MFR BLD: 6.9 %
HDLC SERPL-MCNC: 40 MG/DL
LDLC SERPL CALC-MCNC: 149 MG/DL (CALC)
NONHDLC SERPL-MCNC: 204 MG/DL (CALC)
POTASSIUM SERPL-SCNC: 4.7 MMOL/L (ref 3.5–5.3)
PROT SERPL-MCNC: 7.9 G/DL (ref 6.1–8.1)
PTH-INTACT SERPL-MCNC: 12 PG/ML (ref 16–77)
SODIUM SERPL-SCNC: 137 MMOL/L (ref 135–146)
TRIGL SERPL-MCNC: 359 MG/DL

## 2025-07-10 RX ORDER — ROSUVASTATIN CALCIUM 10 MG/1
10 TABLET, COATED ORAL DAILY
Qty: 90 TABLET | Refills: 3 | Status: SHIPPED | OUTPATIENT
Start: 2025-07-10 | End: 2026-07-10

## 2025-07-10 NOTE — RESULT ENCOUNTER NOTE
CMP came back showing: Liver function is normal.  For chronic kidney disease monitoring, your creatinine is 1.10.  Avoid the regular use of nephrotoxic medications such as NSAIDs (ibuprofen, naproxen, etc) unless directed by a physician.  Maintain adequate hydration, and maintain blood pressure below 130/80 to prevent further deterioration of kidney function.  Routine monitoring and blood work as recommended at least every 3 to 6 months.    Parathyroid hormone is low, calcium is high, concerning for nonparathyroid hypercalcemia.  Vitamin D level was still very high.  If I recommend completely stopping your vitamin D supplementation and following up in 3 months.      Lipid panel came back concerning for elevated LDL.  In addition to lifestyle inventions such as reducing carbohydrates and saturated fat in the diet and exercising regularly, I am going to recommend you start a statin medication.  This will reduce your risk of heart attacks and strokes.  I will send the prescription for you now..  Please follow-up in 6 months to recheck.  For diabetes monitoring, your A1c is at 6.9.  Goal is less than 7.0. You are at goal.  Continue diet and medications as prescribed.  No changes necessary.

## 2025-07-11 LAB — BACTERIA UR CULT: NORMAL

## 2025-07-11 PROCEDURE — RXMED WILLOW AMBULATORY MEDICATION CHARGE

## 2025-07-22 ENCOUNTER — PHARMACY VISIT (OUTPATIENT)
Dept: PHARMACY | Facility: CLINIC | Age: 60
End: 2025-07-22
Payer: MEDICAID

## 2025-08-07 DIAGNOSIS — R11.0 NAUSEA: Primary | ICD-10-CM

## 2025-08-07 RX ORDER — ONDANSETRON 8 MG/1
8 TABLET, ORALLY DISINTEGRATING ORAL EVERY 8 HOURS PRN
Qty: 20 TABLET | Refills: 2 | Status: SHIPPED | OUTPATIENT
Start: 2025-08-07

## 2025-08-14 ENCOUNTER — PHARMACY VISIT (OUTPATIENT)
Dept: PHARMACY | Facility: CLINIC | Age: 60
End: 2025-08-14
Payer: MEDICAID

## 2025-08-14 PROCEDURE — RXMED WILLOW AMBULATORY MEDICATION CHARGE
